# Patient Record
Sex: MALE | Race: WHITE | NOT HISPANIC OR LATINO | Employment: OTHER | URBAN - METROPOLITAN AREA
[De-identification: names, ages, dates, MRNs, and addresses within clinical notes are randomized per-mention and may not be internally consistent; named-entity substitution may affect disease eponyms.]

---

## 2024-06-03 ENCOUNTER — OFFICE VISIT (OUTPATIENT)
Dept: PRIMARY CARE | Facility: CLINIC | Age: 34
End: 2024-06-03
Payer: OTHER GOVERNMENT

## 2024-06-03 VITALS
BODY MASS INDEX: 33.46 KG/M2 | DIASTOLIC BLOOD PRESSURE: 78 MMHG | RESPIRATION RATE: 16 BRPM | WEIGHT: 247 LBS | SYSTOLIC BLOOD PRESSURE: 122 MMHG | OXYGEN SATURATION: 98 % | HEART RATE: 63 BPM | HEIGHT: 72 IN

## 2024-06-03 DIAGNOSIS — G43.511 MIGRAINE AURA, PERSISTENT, INTRACTABLE, WITH STATUS MIGRAINOSUS: ICD-10-CM

## 2024-06-03 DIAGNOSIS — F90.9 ATTENTION DEFICIT HYPERACTIVITY DISORDER (ADHD), UNSPECIFIED ADHD TYPE: ICD-10-CM

## 2024-06-03 DIAGNOSIS — N52.9 ERECTILE DYSFUNCTION, UNSPECIFIED ERECTILE DYSFUNCTION TYPE: ICD-10-CM

## 2024-06-03 DIAGNOSIS — E78.5 DYSLIPIDEMIA: ICD-10-CM

## 2024-06-03 DIAGNOSIS — S06.9XAA TRAUMATIC BRAIN INJURY, WITH UNKNOWN LOSS OF CONSCIOUSNESS STATUS, INITIAL ENCOUNTER (MULTI): ICD-10-CM

## 2024-06-03 DIAGNOSIS — E55.9 VITAMIN D DEFICIENCY: ICD-10-CM

## 2024-06-03 DIAGNOSIS — R53.83 FATIGUE, UNSPECIFIED TYPE: ICD-10-CM

## 2024-06-03 PROBLEM — G89.29 CHRONIC BILATERAL THORACIC BACK PAIN: Status: ACTIVE | Noted: 2024-06-03

## 2024-06-03 PROBLEM — M54.6 CHRONIC BILATERAL THORACIC BACK PAIN: Status: ACTIVE | Noted: 2024-06-03

## 2024-06-03 PROBLEM — G89.29 NECK PAIN, CHRONIC: Status: ACTIVE | Noted: 2024-06-03

## 2024-06-03 PROBLEM — M25.561 PAIN IN BOTH KNEES: Status: ACTIVE | Noted: 2024-06-03

## 2024-06-03 PROBLEM — M54.2 NECK PAIN, CHRONIC: Status: ACTIVE | Noted: 2024-06-03

## 2024-06-03 PROBLEM — M25.562 PAIN IN BOTH KNEES: Status: ACTIVE | Noted: 2024-06-03

## 2024-06-03 PROCEDURE — 99204 OFFICE O/P NEW MOD 45 MIN: CPT | Performed by: FAMILY MEDICINE

## 2024-06-03 PROCEDURE — 1036F TOBACCO NON-USER: CPT | Performed by: FAMILY MEDICINE

## 2024-06-03 RX ORDER — QUETIAPINE FUMARATE 50 MG/1
50 TABLET, FILM COATED ORAL NIGHTLY PRN
COMMUNITY
Start: 2024-04-10 | End: 2024-06-03 | Stop reason: SDUPTHER

## 2024-06-03 RX ORDER — CLONIDINE HYDROCHLORIDE 0.2 MG/1
0.2 TABLET ORAL DAILY PRN
COMMUNITY
Start: 2024-04-10 | End: 2024-06-03 | Stop reason: SDUPTHER

## 2024-06-03 RX ORDER — CLONIDINE HYDROCHLORIDE 0.2 MG/1
0.2 TABLET ORAL NIGHTLY
Qty: 90 TABLET | Refills: 1 | Status: SHIPPED | OUTPATIENT
Start: 2024-06-03

## 2024-06-03 RX ORDER — SILDENAFIL 100 MG/1
100 TABLET, FILM COATED ORAL DAILY PRN
Qty: 12 TABLET | Refills: 3 | Status: SHIPPED | OUTPATIENT
Start: 2024-06-03 | End: 2025-06-03

## 2024-06-03 RX ORDER — SUMATRIPTAN SUCCINATE 100 MG/1
100 TABLET ORAL ONCE AS NEEDED
Qty: 9 TABLET | Refills: 1 | Status: SHIPPED | OUTPATIENT
Start: 2024-06-03 | End: 2025-06-03

## 2024-06-03 RX ORDER — LISDEXAMFETAMINE DIMESYLATE 30 MG/1
30 CAPSULE ORAL
COMMUNITY
Start: 2024-05-22

## 2024-06-03 RX ORDER — QUETIAPINE FUMARATE 50 MG/1
50 TABLET, FILM COATED ORAL NIGHTLY
Qty: 90 TABLET | Refills: 0 | Status: SHIPPED | OUTPATIENT
Start: 2024-06-03

## 2024-06-03 NOTE — PROGRESS NOTES
Subjective   Patient ID: Robe Lr is a 33 y.o. male who presents for Establish Care.    HPI   Patient is here to establish care.     Patient was with Dr. Tenorio in the past. He is retired  and [facility].    Patient has history of TBI, migraines, back and neck pain. He has left knee surgery from past.   Patient does get auras, light sensitive, sound sensitivity with the migraines. He will get ringing in the ears and vibration in the ears.     Patient is taking Clonidine 0.2 mg and Seroquel 50 mg for insomnia. He will get 8 hours with the medications.     He would like to discuss the Vyvanse.     Review of Systems  12 Systems have been reviewed as follows.  Constitutional: Fever, weight gain, weight loss, appetite change, night sweats, fatigue, chills.  Eyes : blurry, double vision, vision, loss, tearing, redness, pain, sensitivity to light, glaucoma.  Ears, nose, mouth, and throat: Hearing loss, ringing in the ears, ear pain, nasal congestion, nasal drainage, nosebleeds, mouth, throat, irritation tooth problem.  Cardiovascular :chest pain, pressure, heart racing, palpitations, sweating, leg swelling, high or low blood pressure  Pulmonary: Cough, yellow or green sputum, blood and sputum, shortness of breath, wheezing  Gastrointestinal: Nausea, vomiting, diarrhea, constipation, pain, blood in stool, or vomitus, heartburn, difficulty swallowing  Genitourinary: incontinence, abnormal bleeding, abnormal discharge, urinary frequency, urinary hesitancy, pain, impotence sexual problem, infection, urinary retention  Musculoskeletal: Pain, stiffness, joint, redness or warmth, arthritis, back pain, weakness, muscle wasting, sprain or fracture  Neuro: Weight weakness, dizziness, change in voice, change in taste change in vision, change in hearing, loss, or change of sensation, trouble walking, balance problems coordination problems, shaking, speech problem  Endocrine , cold or heat intolerance,  blood sugar problem, weight gain or loss missed periods hot flashes, sweats, change in body hair, change in libido, increased thirst, increased urination  Heme/lymph: Swelling, bleeding, problem anemia, bruising, enlarged lymph nodes  Allergic/immunologic: H. plus nasal drip, watery itchy eyes, nasal drainage, immunosuppressed  The above were reviewed and noted negative except as noted in HPI and Problem List.      Objective   /78 (BP Location: Right arm, Patient Position: Sitting, BP Cuff Size: Adult)   Pulse 63   Resp 16   Ht 1.829 m (6')   Wt 112 kg (247 lb)   SpO2 98%   BMI 33.50 kg/m²     Physical Exam  Constitutional: Well developed, well nourished, alert and in no acute distress   Eyes: Normal external exam. Pupils equally round and reactive to light with normal accommodation and extraocular movements intact.  Neck: Supple, no lymphadenopathy or masses.   Cardiovascular: Regular rate and rhythm, normal S1 and S2, no murmurs, gallops, or rubs. Radial pulses normal. No peripheral edema.  Pulmonary: No respiratory distress, lungs clear to auscultation bilaterally. No wheezes, rhonchi, rales.  Abdomen: soft,non tender, non distended, without masses or HSM  Skin: Warm, well perfused, normal skin turgor and color.   Neurologic: Cranial nerves II-XII grossly intact.   Psychiatric: Mood calm and affect normal  Musculoskeletal: Moving all extremities without restriction    Assessment/Plan   Problem List Items Addressed This Visit             ICD-10-CM    TBI (traumatic brain injury) (Multi) S06.9XAA    Relevant Orders    Follow Up In [facility] - PCP - Established    ADHD F90.9    Relevant Medications    QUEtiapine (SEROquel) 50 mg tablet    cloNIDine (Catapres) 0.2 mg tablet    Other Relevant Orders    Follow Up In [facility] - PCP - Established    Migraine aura, persistent, intractable, with status migrainosus G43.511    Relevant Medications    SUMAtriptan (Imitrex) 100 mg tablet     Other Relevant Orders    Follow Up In [facility] - PCP - Established     Other Visit Diagnoses         Codes    Fatigue, unspecified type     R53.83    Relevant Orders    Follow Up In [facility] - PCP - Established    Dyslipidemia     E78.5    Relevant Orders    Follow Up In [facility] - PCP - Established    Vitamin D deficiency     E55.9    Relevant Orders    Follow Up In [facility] - PCP - Established    Erectile dysfunction, unspecified erectile dysfunction type     N52.9    Relevant Medications    sildenafil (Viagra) 100 mg tablet          Scribe Attestation  By signing my name below, I, Kinza Desai MA, Koreyibjose luis   attest that this documentation has been prepared under the direction and in the presence of Earl Deutsch MD.    Provider Attestation - Scribe documentation    All medical record entries made by the Scribe were at my direction and personally dictated by me. I have reviewed the chart and agree that the record accurately reflects my personal performance of the history, physical exam, discussion and plan.    Continue current medications and therapy for chronic medical conditions      [facility]     See orders

## 2024-06-20 NOTE — TELEPHONE ENCOUNTER
Dr. Deutsch Pt    Refill for    lisdexamfetamine (Vyvanse) 30 mg capsule    [facility] #[id number] [facility] [address] AT [facility]

## 2024-06-24 RX ORDER — LISDEXAMFETAMINE DIMESYLATE 30 MG/1
30 CAPSULE ORAL
Qty: 30 CAPSULE | Refills: 0 | Status: CANCELLED | OUTPATIENT
Start: 2024-06-24 | End: 2024-07-24

## 2024-06-24 RX ORDER — LISDEXAMFETAMINE DIMESYLATE 30 MG/1
30 CAPSULE ORAL
Qty: 30 CAPSULE | Refills: 0 | OUTPATIENT
Start: 2024-06-24

## 2024-06-24 NOTE — TELEPHONE ENCOUNTER
CB patient     Per CB patient needs a office visit.  
Dr. Deutsch Pt     Refill for     lisdexamfetamine (Vyvanse) 30 mg capsule     [id number] [address] AT [facility] OF [facility]    Message was sent back 6/20. He does have OV 7/09, last appt was 6/03- this was refused is there a reason?  Please advise pt is calling again.  
Next visit 7/9/24  
no

## 2024-06-25 DIAGNOSIS — Z79.899 MEDICATION MANAGEMENT: ICD-10-CM

## 2024-06-25 DIAGNOSIS — F90.9 ATTENTION DEFICIT HYPERACTIVITY DISORDER (ADHD), UNSPECIFIED ADHD TYPE: ICD-10-CM

## 2024-06-25 NOTE — TELEPHONE ENCOUNTER
LVM to patient. He will need to come in for UDS and contract for Vyvanse. He is also taking clonidine and need to check how he is doing with the medication.

## 2024-06-25 NOTE — TELEPHONE ENCOUNTER
Patient called back and is irate that the information about needing a UDS for the refill of Vyvanse took over 4 phone calls. He is asking if a short script can be sent until his 7/9/24 visit so he is not out of the medication. In the meantime, he will come in ASAP for a UDS to get done prior to his visit.

## 2024-06-26 ENCOUNTER — CLINICAL SUPPORT (OUTPATIENT)
Dept: PRIMARY CARE | Facility: CLINIC | Age: 34
End: 2024-06-26
Payer: OTHER GOVERNMENT

## 2024-06-26 DIAGNOSIS — Z79.899 MEDICATION MANAGEMENT: ICD-10-CM

## 2024-06-26 PROCEDURE — 80365 DRUG SCREENING OXYCODONE: CPT

## 2024-06-26 PROCEDURE — 80358 DRUG SCREENING METHADONE: CPT

## 2024-06-26 PROCEDURE — 80368 SEDATIVE HYPNOTICS: CPT

## 2024-06-26 PROCEDURE — 80373 DRUG SCREENING TRAMADOL: CPT

## 2024-06-26 PROCEDURE — 80354 DRUG SCREENING FENTANYL: CPT

## 2024-06-26 PROCEDURE — 82570 ASSAY OF URINE CREATININE: CPT

## 2024-06-26 PROCEDURE — 80324 DRUG SCREEN AMPHETAMINES 1/2: CPT

## 2024-06-26 PROCEDURE — 80346 BENZODIAZEPINES1-12: CPT

## 2024-06-26 PROCEDURE — 80307 DRUG TEST PRSMV CHEM ANLYZR: CPT

## 2024-06-26 PROCEDURE — 80361 OPIATES 1 OR MORE: CPT

## 2024-06-26 NOTE — PROGRESS NOTES
Subjective   Patient ID: Robe Lr is a 34 y.o. male who presents for No chief complaint on file..    HPI Pt presents today for UDS/CSA for Vyvanse 30 mg.    Review of Systems    Objective   There were no vitals taken for this visit.    Physical Exam    Assessment/Plan

## 2024-06-27 LAB
AMPHETAMINES UR QL SCN: ABNORMAL
BARBITURATES UR QL SCN: ABNORMAL
BZE UR QL SCN: ABNORMAL
CANNABINOIDS UR QL SCN: ABNORMAL
CREAT UR-MCNC: 57 MG/DL (ref 20–370)
PCP UR QL SCN: ABNORMAL

## 2024-06-27 RX ORDER — LISDEXAMFETAMINE DIMESYLATE 30 MG/1
30 CAPSULE ORAL
Qty: 15 CAPSULE | Refills: 0 | Status: SHIPPED | OUTPATIENT
Start: 2024-06-27

## 2024-06-30 LAB
AMPHET UR-MCNC: 1761 NG/ML
MDA UR-MCNC: <200 NG/ML
MDEA UR-MCNC: <200 NG/ML
MDMA UR-MCNC: <200 NG/ML
METHAMPHET UR-MCNC: <200 NG/ML
PHENTERMINE UR CFM-MCNC: <200 NG/ML

## 2024-07-01 LAB
AMPHET UR-MCNC: 1963 NG/ML
MDA UR-MCNC: <200 NG/ML
MDEA UR-MCNC: <200 NG/ML
MDMA UR-MCNC: <200 NG/ML
METHAMPHET UR-MCNC: <200 NG/ML
PHENTERMINE UR CFM-MCNC: <200 NG/ML

## 2024-07-09 ENCOUNTER — APPOINTMENT (OUTPATIENT)
Dept: PRIMARY CARE | Facility: CLINIC | Age: 34
End: 2024-07-09
Payer: OTHER GOVERNMENT

## 2024-07-12 ENCOUNTER — APPOINTMENT (OUTPATIENT)
Dept: PRIMARY CARE | Facility: CLINIC | Age: 34
End: 2024-07-12
Payer: OTHER GOVERNMENT

## 2024-07-12 VITALS
OXYGEN SATURATION: 99 % | TEMPERATURE: 98.4 F | HEART RATE: 61 BPM | BODY MASS INDEX: 34.13 KG/M2 | SYSTOLIC BLOOD PRESSURE: 118 MMHG | HEIGHT: 72 IN | WEIGHT: 252 LBS | DIASTOLIC BLOOD PRESSURE: 88 MMHG

## 2024-07-12 DIAGNOSIS — S06.9XAA TRAUMATIC BRAIN INJURY, WITH UNKNOWN LOSS OF CONSCIOUSNESS STATUS, INITIAL ENCOUNTER (MULTI): ICD-10-CM

## 2024-07-12 DIAGNOSIS — R53.83 FATIGUE, UNSPECIFIED TYPE: ICD-10-CM

## 2024-07-12 DIAGNOSIS — E55.9 VITAMIN D DEFICIENCY: ICD-10-CM

## 2024-07-12 DIAGNOSIS — N52.9 ERECTILE DYSFUNCTION, UNSPECIFIED ERECTILE DYSFUNCTION TYPE: ICD-10-CM

## 2024-07-12 DIAGNOSIS — G43.511 MIGRAINE AURA, PERSISTENT, INTRACTABLE, WITH STATUS MIGRAINOSUS: ICD-10-CM

## 2024-07-12 DIAGNOSIS — F90.9 ATTENTION DEFICIT HYPERACTIVITY DISORDER (ADHD), UNSPECIFIED ADHD TYPE: ICD-10-CM

## 2024-07-12 DIAGNOSIS — E78.5 DYSLIPIDEMIA: ICD-10-CM

## 2024-07-12 PROCEDURE — 99214 OFFICE O/P EST MOD 30 MIN: CPT | Performed by: FAMILY MEDICINE

## 2024-07-12 RX ORDER — LISDEXAMFETAMINE DIMESYLATE 30 MG/1
30 CAPSULE ORAL
Qty: 30 CAPSULE | Refills: 0 | Status: SHIPPED | OUTPATIENT
Start: 2024-07-12

## 2024-07-12 RX ORDER — SILDENAFIL 100 MG/1
100 TABLET, FILM COATED ORAL DAILY PRN
Qty: 12 TABLET | Refills: 3 | Status: SHIPPED | OUTPATIENT
Start: 2024-07-12 | End: 2025-07-12

## 2024-07-12 NOTE — PROGRESS NOTES
Subjective   Patient ID: Robe Lr is a 34 y.o. male who presents for ADHD.    HPI   Follow up ADHD   Currently taking Vyvanse 30 mg daily with good symptom control, good tolerance, and good compliance.   UDS done 06/26/2024  CSA done 07/12/2024    Review of Systems  12 Systems have been reviewed as follows.  Constitutional: Fever, weight gain, weight loss, appetite change, night sweats, fatigue, chills.  Eyes : blurry, double vision, vision, loss, tearing, redness, pain, sensitivity to light, glaucoma.  Ears, nose, mouth, and throat: Hearing loss, ringing in the ears, ear pain, nasal congestion, nasal drainage, nosebleeds, mouth, throat, irritation tooth problem.  Cardiovascular :chest pain, pressure, heart racing, palpitations, sweating, leg swelling, high or low blood pressure  Pulmonary: Cough, yellow or green sputum, blood and sputum, shortness of breath, wheezing  Gastrointestinal: Nausea, vomiting, diarrhea, constipation, pain, blood in stool, or vomitus, heartburn, difficulty swallowing  Genitourinary: incontinence, abnormal bleeding, abnormal discharge, urinary frequency, urinary hesitancy, pain, impotence sexual problem, infection, urinary retention  Musculoskeletal: Pain, stiffness, joint, redness or warmth, arthritis, back pain, weakness, muscle wasting, sprain or fracture  Neuro: Weight weakness, dizziness, change in voice, change in taste change in vision, change in hearing, loss, or change of sensation, trouble walking, balance problems coordination problems, shaking, speech problem  Endocrine , cold or heat intolerance, blood sugar problem, weight gain or loss missed periods hot flashes, sweats, change in body hair, change in libido, increased thirst, increased urination  Heme/lymph: Swelling, bleeding, problem anemia, bruising, enlarged lymph nodes  Allergic/immunologic: H. plus nasal drip, watery itchy eyes, nasal drainage, immunosuppressed  The above were reviewed and noted negative except  as noted in HPI and Problem List.    Objective   /88 (BP Location: Right arm, Patient Position: Sitting, BP Cuff Size: Adult)   Pulse 61   Temp 36.9 °C (98.4 °F)   Ht 1.829 m (6')   Wt 114 kg (252 lb)   SpO2 99%   BMI 34.18 kg/m²     Physical Exam  Constitutional: Well developed, well nourished, alert and in no acute distress     Assessment/Plan   Problem List Items Addressed This Visit             ICD-10-CM    TBI (traumatic brain injury) (Multi) S06.9XAA    Relevant Orders    Follow Up In [facility] - PCP - Established    ADHD F90.9    Relevant Medications    lisdexamfetamine (Vyvanse) 30 mg capsule    Other Relevant Orders    Follow Up In [facility] - PCP - Established    Migraine aura, persistent, intractable, with status migrainosus G43.511    Relevant Orders    Follow Up In [facility] - PCP - Established     Other Visit Diagnoses         Codes    Fatigue, unspecified type     R53.83    Relevant Orders    Follow Up In [facility] - PCP - Established    Dyslipidemia     E78.5    Relevant Orders    Follow Up In [facility] - PCP - Established    Vitamin D deficiency     E55.9    Relevant Orders    Follow Up In [facility] - PCP - Established    Erectile dysfunction, unspecified erectile dysfunction type     N52.9    Relevant Medications    sildenafil (Viagra) 100 mg tablet    Other Relevant Orders    Follow Up In [facility] - PCP - Established          Continue current medications and therapy for chronic medical conditions    I have personally reviewed the OARRS report with the patient and have considered the risk of abuse, addiction, dependence and diversion.    Patient's use of medication is allowing patient to be able to perform ADL's. Patient is always being evaluated for the possibility of lowering the medication dosage.

## 2024-08-08 DIAGNOSIS — F90.9 ATTENTION DEFICIT HYPERACTIVITY DISORDER (ADHD), UNSPECIFIED ADHD TYPE: ICD-10-CM

## 2024-08-08 NOTE — TELEPHONE ENCOUNTER
Dr. Deutsch patient  Refill for:  cloNIDine (Catapres) 0.2 mg tablet   QUEtiapine (SEROquel) 50 mg tablet  lisdexamfetamine (Vyvanse) 30 mg capsule   [facility] in [facility]

## 2024-08-11 RX ORDER — LISDEXAMFETAMINE DIMESYLATE 30 MG/1
30 CAPSULE ORAL
Qty: 30 CAPSULE | Refills: 0 | Status: SHIPPED | OUTPATIENT
Start: 2024-08-11

## 2024-08-11 RX ORDER — CLONIDINE HYDROCHLORIDE 0.2 MG/1
0.2 TABLET ORAL NIGHTLY
Qty: 90 TABLET | Refills: 1 | Status: SHIPPED | OUTPATIENT
Start: 2024-08-11

## 2024-08-11 RX ORDER — QUETIAPINE FUMARATE 50 MG/1
50 TABLET, FILM COATED ORAL NIGHTLY
Qty: 90 TABLET | Refills: 0 | Status: SHIPPED | OUTPATIENT
Start: 2024-08-11

## 2024-09-09 DIAGNOSIS — F90.9 ATTENTION DEFICIT HYPERACTIVITY DISORDER (ADHD), UNSPECIFIED ADHD TYPE: ICD-10-CM

## 2024-09-09 NOTE — TELEPHONE ENCOUNTER
Dr. Deutsch patient  Refill for lisdexamfetamine (Vyvanse) 30 mg capsule  [facility] #[id number] [facility] [address] AT [facility]

## 2024-09-11 RX ORDER — LISDEXAMFETAMINE DIMESYLATE 30 MG/1
30 CAPSULE ORAL
Qty: 30 CAPSULE | Refills: 0 | Status: SHIPPED | OUTPATIENT
Start: 2024-09-11

## 2024-10-04 ENCOUNTER — OFFICE VISIT (OUTPATIENT)
Dept: PRIMARY CARE | Facility: CLINIC | Age: 34
End: 2024-10-04
Payer: OTHER GOVERNMENT

## 2024-10-04 VITALS
BODY MASS INDEX: 33.08 KG/M2 | TEMPERATURE: 98.3 F | WEIGHT: 244.2 LBS | HEIGHT: 72 IN | DIASTOLIC BLOOD PRESSURE: 70 MMHG | RESPIRATION RATE: 16 BRPM | HEART RATE: 70 BPM | OXYGEN SATURATION: 99 % | SYSTOLIC BLOOD PRESSURE: 120 MMHG

## 2024-10-04 DIAGNOSIS — N52.9 ERECTILE DYSFUNCTION, UNSPECIFIED ERECTILE DYSFUNCTION TYPE: ICD-10-CM

## 2024-10-04 DIAGNOSIS — G43.511 MIGRAINE AURA, PERSISTENT, INTRACTABLE, WITH STATUS MIGRAINOSUS: ICD-10-CM

## 2024-10-04 DIAGNOSIS — E55.9 VITAMIN D DEFICIENCY: ICD-10-CM

## 2024-10-04 DIAGNOSIS — G47.09 OTHER INSOMNIA: Primary | ICD-10-CM

## 2024-10-04 DIAGNOSIS — E78.5 DYSLIPIDEMIA: ICD-10-CM

## 2024-10-04 DIAGNOSIS — R53.83 FATIGUE, UNSPECIFIED TYPE: ICD-10-CM

## 2024-10-04 DIAGNOSIS — S06.9XAA TRAUMATIC BRAIN INJURY, WITH UNKNOWN LOSS OF CONSCIOUSNESS STATUS, INITIAL ENCOUNTER (MULTI): ICD-10-CM

## 2024-10-04 DIAGNOSIS — F90.9 ATTENTION DEFICIT HYPERACTIVITY DISORDER (ADHD), UNSPECIFIED ADHD TYPE: ICD-10-CM

## 2024-10-04 DIAGNOSIS — Z79.899 MEDICATION MANAGEMENT: ICD-10-CM

## 2024-10-04 PROCEDURE — 1036F TOBACCO NON-USER: CPT | Performed by: FAMILY MEDICINE

## 2024-10-04 PROCEDURE — 99214 OFFICE O/P EST MOD 30 MIN: CPT | Performed by: FAMILY MEDICINE

## 2024-10-04 PROCEDURE — 3008F BODY MASS INDEX DOCD: CPT | Performed by: FAMILY MEDICINE

## 2024-10-04 RX ORDER — LISDEXAMFETAMINE DIMESYLATE 30 MG/1
30 CAPSULE ORAL
Qty: 30 CAPSULE | Refills: 0 | Status: SHIPPED | OUTPATIENT
Start: 2024-10-10

## 2024-10-04 ASSESSMENT — ENCOUNTER SYMPTOMS
JOINT SWELLING: 0
CHOKING: 0
HYPERACTIVE: 0
CONSTITUTIONAL NEGATIVE: 1
HEMATURIA: 0
WHEEZING: 0
PHOTOPHOBIA: 0
TROUBLE SWALLOWING: 0
DIZZINESS: 0
APPETITE CHANGE: 0
SINUS PRESSURE: 0
APNEA: 0
DIFFICULTY URINATING: 0
ABDOMINAL PAIN: 0
CONSTIPATION: 0
HALLUCINATIONS: 0
RECTAL PAIN: 0
DIARRHEA: 0
ANAL BLEEDING: 0
BACK PAIN: 0
CONFUSION: 0
NAUSEA: 0
DIAPHORESIS: 0
FACIAL ASYMMETRY: 0
BRUISES/BLEEDS EASILY: 0
WEAKNESS: 0
NUMBNESS: 0
SEIZURES: 0
VOMITING: 0
CHILLS: 0
HEADACHES: 0
MYALGIAS: 0
RHINORRHEA: 0
UNEXPECTED WEIGHT CHANGE: 0
NECK PAIN: 0
NECK STIFFNESS: 0
COLOR CHANGE: 0
FACIAL SWELLING: 0
STRIDOR: 0
SHORTNESS OF BREATH: 0
EYE DISCHARGE: 0
COUGH: 0
GASTROINTESTINAL NEGATIVE: 1
ADENOPATHY: 0
PALPITATIONS: 0
DECREASED CONCENTRATION: 0
DYSPHORIC MOOD: 0
FATIGUE: 0
NERVOUS/ANXIOUS: 1
ABDOMINAL DISTENTION: 0
EYE ITCHING: 0
FREQUENCY: 0
BLOOD IN STOOL: 0
WOUND: 0
EYE REDNESS: 0
POLYPHAGIA: 0
VOICE CHANGE: 0
ARTHRALGIAS: 0
SINUS PAIN: 0
SORE THROAT: 0
TREMORS: 0
FEVER: 0
POLYDIPSIA: 0
FLANK PAIN: 0
EYE PAIN: 0
LIGHT-HEADEDNESS: 0
CARDIOVASCULAR NEGATIVE: 1
AGITATION: 0
DYSURIA: 0
ACTIVITY CHANGE: 0
CHEST TIGHTNESS: 0
SLEEP DISTURBANCE: 0
DEPRESSION: 0
SPEECH DIFFICULTY: 0

## 2024-10-04 NOTE — PROGRESS NOTES
Subjective   Patient ID: Robe Lr is a 34 y.o. male who presents for ADHD.    HPI   The patient is in office for  follow up on his Vyvanse medication. He states the medication is working well for him.     The patient does not have any other questions or concerns.     Review of Systems   Constitutional: Negative.  Negative for activity change, appetite change, chills, diaphoresis, fatigue, fever and unexpected weight change.   HENT: Negative.  Negative for congestion, dental problem, ear discharge, facial swelling, hearing loss, mouth sores, nosebleeds, postnasal drip, rhinorrhea, sinus pressure, sinus pain, sneezing, sore throat, tinnitus, trouble swallowing and voice change.    Eyes:  Negative for photophobia, pain, discharge, redness, itching and visual disturbance.   Respiratory:  Negative for apnea, cough, choking, chest tightness, shortness of breath, wheezing and stridor.    Cardiovascular: Negative.  Negative for chest pain, palpitations and leg swelling.   Gastrointestinal: Negative.  Negative for abdominal distention, abdominal pain, anal bleeding, blood in stool, constipation, diarrhea, nausea, rectal pain and vomiting.   Endocrine: Negative for cold intolerance, heat intolerance, polydipsia, polyphagia and polyuria.   Genitourinary:  Negative for decreased urine volume, difficulty urinating, dysuria, enuresis, flank pain, frequency, hematuria and urgency.   Musculoskeletal:  Negative for arthralgias, back pain, gait problem, joint swelling, myalgias, neck pain and neck stiffness.   Skin:  Negative for color change, pallor, rash and wound.   Allergic/Immunologic: Negative for environmental allergies, food allergies and immunocompromised state.   Neurological:  Negative for dizziness, tremors, seizures, syncope, facial asymmetry, speech difficulty, weakness, light-headedness, numbness and headaches.   Hematological:  Negative for adenopathy. Does not bruise/bleed easily.   Psychiatric/Behavioral:   Negative for agitation, behavioral problems, confusion, decreased concentration, dysphoric mood, hallucinations, self-injury, sleep disturbance and suicidal ideas. The patient is nervous/anxious. The patient is not hyperactive.    All other systems reviewed and are negative.      Objective   /70 (BP Location: Left arm, Patient Position: Sitting, BP Cuff Size: Adult)   Pulse 70   Temp 36.8 °C (98.3 °F) (Temporal)   Resp 16   Ht 1.829 m (6')   Wt 111 kg (244 lb 3.2 oz)   SpO2 99%   BMI 33.12 kg/m²     Physical Exam  Vitals reviewed.   Constitutional:       General: He is not in acute distress.     Appearance: Normal appearance. He is normal weight. He is not ill-appearing or diaphoretic.   HENT:      Head: Normocephalic.      Right Ear: Tympanic membrane and external ear normal.      Left Ear: Tympanic membrane and external ear normal.      Nose: Nose normal. No congestion.      Mouth/Throat:      Pharynx: No posterior oropharyngeal erythema.   Eyes:      General:         Right eye: No discharge.         Left eye: No discharge.      Extraocular Movements: Extraocular movements intact.      Conjunctiva/sclera: Conjunctivae normal.      Pupils: Pupils are equal, round, and reactive to light.   Cardiovascular:      Rate and Rhythm: Normal rate and regular rhythm.      Pulses: Normal pulses.      Heart sounds: Normal heart sounds. No murmur heard.  Pulmonary:      Effort: Pulmonary effort is normal. No respiratory distress.      Breath sounds: Normal breath sounds. No wheezing or rales.   Chest:      Chest wall: No tenderness.   Abdominal:      General: Abdomen is flat. Bowel sounds are normal. There is no distension.      Palpations: There is no mass.      Tenderness: There is no abdominal tenderness. There is no guarding.   Musculoskeletal:         General: No tenderness. Normal range of motion.      Cervical back: Normal range of motion and neck supple. No tenderness.      Right lower leg: No edema.       Left lower leg: No edema.   Skin:     General: Skin is dry.      Coloration: Skin is not jaundiced.      Findings: No bruising, erythema or rash.   Neurological:      General: No focal deficit present.      Mental Status: He is alert and oriented to person, place, and time. Mental status is at baseline.      Cranial Nerves: No cranial nerve deficit.      Sensory: No sensory deficit.      Coordination: Coordination normal.      Gait: Gait normal.   Psychiatric:         Mood and Affect: Mood normal.         Thought Content: Thought content normal.         Judgment: Judgment normal.         Assessment/Plan   Problem List Items Addressed This Visit             ICD-10-CM    TBI (traumatic brain injury) (Multi) S06.9XAA    Relevant Orders    Follow Up In [facility] - PCP - Established    ADHD F90.9    Relevant Medications    lisdexamfetamine (Vyvanse) 30 mg capsule (Start on 10/10/2024)    Other Relevant Orders    Follow Up In [facility] - PCP - Established    Migraine aura, persistent, intractable, with status migrainosus G43.511    Relevant Orders    Follow Up In [facility] - PCP - Established     Other Visit Diagnoses         Codes    Other insomnia    -  Primary G47.09    Relevant Orders    Follow Up In [facility] - PCP - Established    Fatigue, unspecified type     R53.83    Relevant Orders    Follow Up In [facility] - PCP - Established    Dyslipidemia     E78.5    Relevant Orders    Follow Up In [facility] - PCP - Established    Vitamin D deficiency     E55.9    Relevant Orders    Follow Up In [facility] - PCP - Established    Erectile dysfunction, unspecified erectile dysfunction type     N52.9    Relevant Orders    Follow Up In [facility] - PCP - Established    Medication management     Z79.899    Relevant Orders    Opiate/Opioid/Benzo Prescription Compliance    Follow Up In [facility] - PCP - Established           Scribe Attestation  By signing my name below, I, Sandra RandhawaABISAI escalante Scribe   attest that this documentation has been prepared under the direction and in the presence of Earl Deutsch MD.      Provider Attestation - Scribe documentation    All medical record entries made by the Scribe were at my direction and personally dictated by me. I have reviewed the chart and agree that the record accurately reflects my personal performance of the history, physical exam, discussion and plan.    Continue current medications and therapy for chronic medical conditions      Patient lost 8 lbs from last visit.     Refill Vyvanse    UDS next & Q 6 months     Continue current medications and therapy for chronic medical conditions    I have personally reviewed the OARRS report with the patient and have considered the risk of abuse, addiction, dependence and diversion.    Patient's use of medication is allowing patient to be able to perform ADL's. Patient is always being evaluated for the possibility of lowering the medication dosage.         Earl Deutsch MD  Physician  Primary Care     Progress Notes     Signed     Encounter Date: 7/12/2024     Signed       Expand All Collapse All       Subjective  Patient ID: Robe Lr is a 34 y.o. male who presents for ADHD.     HPI   Follow up ADHD   Currently taking Vyvanse 30 mg daily with good symptom control, good tolerance, and good compliance.   UDS done 06/26/2024  CSA done 07/12/2024     Review of Systems  12 Systems have been reviewed as follows.  Constitutional: Fever, weight gain, weight loss, appetite change, night sweats, fatigue, chills.  Eyes : blurry, double vision, vision, loss, tearing, redness, pain, sensitivity to light, glaucoma.  Ears, nose, mouth, and throat: Hearing loss, ringing in the ears, ear pain, nasal congestion, nasal drainage, nosebleeds, mouth, throat, irritation tooth problem.  Cardiovascular :chest pain, pressure, heart racing, palpitations,  sweating, leg swelling, high or low blood pressure  Pulmonary: Cough, yellow or green sputum, blood and sputum, shortness of breath, wheezing  Gastrointestinal: Nausea, vomiting, diarrhea, constipation, pain, blood in stool, or vomitus, heartburn, difficulty swallowing  Genitourinary: incontinence, abnormal bleeding, abnormal discharge, urinary frequency, urinary hesitancy, pain, impotence sexual problem, infection, urinary retention  Musculoskeletal: Pain, stiffness, joint, redness or warmth, arthritis, back pain, weakness, muscle wasting, sprain or fracture  Neuro: Weight weakness, dizziness, change in voice, change in taste change in vision, change in hearing, loss, or change of sensation, trouble walking, balance problems coordination problems, shaking, speech problem  Endocrine , cold or heat intolerance, blood sugar problem, weight gain or loss missed periods hot flashes, sweats, change in body hair, change in libido, increased thirst, increased urination  Heme/lymph: Swelling, bleeding, problem anemia, bruising, enlarged lymph nodes  Allergic/immunologic: H. plus nasal drip, watery itchy eyes, nasal drainage, immunosuppressed  The above were reviewed and noted negative except as noted in HPI and Problem List.           Objective  /88 (BP Location: Right arm, Patient Position: Sitting, BP Cuff Size: Adult)   Pulse 61   Temp 36.9 °C (98.4 °F)   Ht 1.829 m (6')   Wt 114 kg (252 lb)   SpO2 99%   BMI 34.18 kg/m²      Physical Exam  Constitutional: Well developed, well nourished, alert and in no acute distress            Assessment/Plan  Problem List Items Addressed This Visit               ICD-10-CM     TBI (traumatic brain injury) (Multi) S06.9XAA     Relevant Orders     Follow Up In [facility] - PCP - Established     ADHD F90.9     Relevant Medications     lisdexamfetamine (Vyvanse) 30 mg capsule     Other Relevant Orders     Follow Up In [facility] - PCP - Established      Migraine aura, persistent, intractable, with status migrainosus G43.511     Relevant Orders     Follow Up In [facility] - PCP - Established      Other Visit Diagnoses           Codes     Fatigue, unspecified type     R53.83     Relevant Orders     Follow Up In [facility] - PCP - Established     Dyslipidemia     E78.5     Relevant Orders     Follow Up In [facility] - PCP - Established     Vitamin D deficiency     E55.9     Relevant Orders     Follow Up In [facility] - PCP - Established     Erectile dysfunction, unspecified erectile dysfunction type     N52.9     Relevant Medications     sildenafil (Viagra) 100 mg tablet     Other Relevant Orders     Follow Up In [facility] - PCP - Established             Continue current medications and therapy for chronic medical conditions     I have personally reviewed the OARRS report with the patient and have considered the risk of abuse, addiction, dependence and diversion.     Patient's use of medication is allowing patient to be able to perform ADL's. Patient is always being evaluated for the possibility of lowering the medication dosage.                        Electronically signed by Earl Deutsch MD at 7/14/2024  1:09 PM         Office Visit on 7/12/2024            Revision History          Note shared with patient  Additional Documentation    Vitals: /88 (BP Location: Right arm, Patient Position: Sitting, BP Cuff Size: Adult)     Pulse 61     Temp 36.9 °C (98.4 °F)     Ht 1.829 m (6')     Wt 114 kg (252 lb)     SpO2 99%     BMI 34.18 kg/m²     BSA 2.41 m²          More Vitals   Flowsheets: Interfaced Flowsheet Data,     RADAR AP SCORING   Encounter Info: Billing Info,     History,     Allergies     Orders Placed    Follow Up In [facility] - PCP - Established  Other Orders Performed    Follow Up In [facility] - PCP - Established  Medication Changes      None  Medication List  Visit  Diagnoses      Attention deficit hyperactivity disorder (ADHD), unspecified ADHD type    Traumatic brain injury, with unknown loss of consciousness status, initial encounter (Multi)    Migraine aura, persistent, intractable, with status migrainosus    Fatigue, unspecified type    Dyslipidemia    Vitamin D deficiency    Erectile dysfunction, unspecified erectile dysfunction type  Problem List

## 2024-10-05 LAB
AMPHETAMINES UR QL SCN: ABNORMAL
BARBITURATES UR QL SCN: ABNORMAL
BZE UR QL SCN: ABNORMAL
CANNABINOIDS UR QL SCN: ABNORMAL
CREAT UR-MCNC: 87.2 MG/DL (ref 20–370)
PCP UR QL SCN: ABNORMAL

## 2024-10-10 LAB
AMPHET UR-MCNC: 3280 NG/ML
MDA UR-MCNC: <200 NG/ML
MDEA UR-MCNC: <200 NG/ML
MDMA UR-MCNC: <200 NG/ML
METHAMPHET UR-MCNC: <200 NG/ML
PHENTERMINE UR CFM-MCNC: <200 NG/ML

## 2024-10-11 ENCOUNTER — APPOINTMENT (OUTPATIENT)
Dept: PRIMARY CARE | Facility: CLINIC | Age: 34
End: 2024-10-11
Payer: OTHER GOVERNMENT

## 2024-11-09 DIAGNOSIS — F90.9 ATTENTION DEFICIT HYPERACTIVITY DISORDER (ADHD), UNSPECIFIED ADHD TYPE: ICD-10-CM

## 2024-11-11 NOTE — TELEPHONE ENCOUNTER
PT OF [facility]     PT CALLED IN FOR MED REFILL    SEROQUEL   [facility]SE  [facility]ONIDINE     [facility]

## 2024-11-12 RX ORDER — QUETIAPINE FUMARATE 50 MG/1
TABLET, FILM COATED ORAL
Qty: 90 TABLET | Refills: 0 | Status: SHIPPED | OUTPATIENT
Start: 2024-11-12

## 2024-11-12 RX ORDER — LISDEXAMFETAMINE DIMESYLATE 30 MG/1
30 CAPSULE ORAL
Qty: 30 CAPSULE | Refills: 0 | Status: SHIPPED | OUTPATIENT
Start: 2024-11-12

## 2024-11-12 RX ORDER — CLONIDINE HYDROCHLORIDE 0.2 MG/1
0.2 TABLET ORAL NIGHTLY
Qty: 90 TABLET | Refills: 1 | Status: SHIPPED | OUTPATIENT
Start: 2024-11-12

## 2024-12-09 DIAGNOSIS — F90.9 ATTENTION DEFICIT HYPERACTIVITY DISORDER (ADHD), UNSPECIFIED ADHD TYPE: ICD-10-CM

## 2024-12-09 NOTE — TELEPHONE ENCOUNTER
[facility] #[id number] [place], [place] - [address] AT [facility] [place]   Pt needs refill on  lisdexamfetamine (Vyvanse) 30 mg capsule

## 2024-12-12 RX ORDER — LISDEXAMFETAMINE DIMESYLATE 30 MG/1
30 CAPSULE ORAL
Qty: 30 CAPSULE | Refills: 0 | Status: SHIPPED | OUTPATIENT
Start: 2024-12-12

## 2025-01-10 DIAGNOSIS — F90.9 ATTENTION DEFICIT HYPERACTIVITY DISORDER (ADHD), UNSPECIFIED ADHD TYPE: ICD-10-CM

## 2025-01-10 NOTE — TELEPHONE ENCOUNTER
[facility]ORE #[id number] [place], [place] - [address] AT [facility] [place]   Pt needs refill on  lisdexamfetamine (Vyvanse) 30 mg capsule

## 2025-01-11 ENCOUNTER — OFFICE VISIT (OUTPATIENT)
Dept: URGENT CARE | Age: 35
End: 2025-01-11
Payer: OTHER GOVERNMENT

## 2025-01-11 VITALS
HEIGHT: 78 IN | OXYGEN SATURATION: 99 % | DIASTOLIC BLOOD PRESSURE: 73 MMHG | WEIGHT: 222 LBS | SYSTOLIC BLOOD PRESSURE: 108 MMHG | BODY MASS INDEX: 25.69 KG/M2 | RESPIRATION RATE: 18 BRPM | HEART RATE: 55 BPM | TEMPERATURE: 98.5 F

## 2025-01-11 DIAGNOSIS — J40 BRONCHITIS: Primary | ICD-10-CM

## 2025-01-11 RX ORDER — AZITHROMYCIN 250 MG/1
TABLET, FILM COATED ORAL
Qty: 6 TABLET | Refills: 0 | Status: SHIPPED | OUTPATIENT
Start: 2025-01-11 | End: 2025-01-15

## 2025-01-11 RX ORDER — ALBUTEROL SULFATE 90 UG/1
2 INHALANT RESPIRATORY (INHALATION) EVERY 6 HOURS PRN
Qty: 18 G | Refills: 0 | Status: SHIPPED | OUTPATIENT
Start: 2025-01-11 | End: 2026-01-11

## 2025-01-11 RX ORDER — PREDNISONE 10 MG/1
TABLET ORAL
Qty: 21 TABLET | Refills: 0 | Status: SHIPPED | OUTPATIENT
Start: 2025-01-11 | End: 2025-01-19

## 2025-01-11 RX ORDER — BENZONATATE 200 MG/1
200 CAPSULE ORAL 3 TIMES DAILY PRN
Qty: 30 CAPSULE | Refills: 0 | Status: SHIPPED | OUTPATIENT
Start: 2025-01-11

## 2025-01-11 ASSESSMENT — ENCOUNTER SYMPTOMS
CHILLS: 1
WHEEZING: 1
GASTROINTESTINAL NEGATIVE: 1
RHINORRHEA: 0
SORE THROAT: 1
SINUS PRESSURE: 1
COUGH: 1
EYES NEGATIVE: 1
FEVER: 0
CARDIOVASCULAR NEGATIVE: 1

## 2025-01-11 NOTE — PROGRESS NOTES
"Subjective   Patient ID: Robe Lr is a 34 y.o. male. They present today with a chief complaint of Cough and Other (PT STATES POSSIBLY PNEUMONIA AND COUGHING UP BLOOD. SX X 9 DAYS).    History of Present Illness  Subjective  History was provided by the patient.  Robe Lr is a 34 y.o. male who presents for evaluation of symptoms of a URI. Symptoms include chest pain during cough, chills, dry cough, headache, nasal blockage, sinus and nasal congestion, sore throat, and wheezing. Onset of symptoms was 9 days ago, unchanged since that time. Associated symptoms include no  fever. He is drinking moderate amounts of fluids. Evaluation to date: none. Treatment to date: none    Objective  /73 (BP Location: Left arm, Patient Position: Sitting, BP Cuff Size: Large adult)   Pulse 55   Temp 36.9 °C (98.5 °F) (Oral)   Resp 18   Ht 2.235 m (7' 4\")   Wt 101 kg (222 lb)   SpO2 99%   BMI 20.16 kg/m²   [unfilled]     Assessment/Plan  bronchitis and viral upper respiratory illness    Discussed diagnosis and treatment of URI.  Suggested symptomatic OTC remedies.  Nasal saline spray for congestion.  Azithromycin per orders.  Follow up as needed.        History provided by:  Patient and medical records  Cough  Associated symptoms include chills, a sore throat and wheezing. Pertinent negatives include no ear pain, fever or rhinorrhea.       Past Medical History  Allergies as of 01/11/2025    (No Known Allergies)       (Not in a hospital admission)       Past Medical History:   Diagnosis Date    Back pain     Migraine     Neck pain     TBI (traumatic brain injury) (Multi)     In , he fell 30 feet and landed on head.       Past Surgical History:   Procedure Laterality Date    KNEE SURGERY Left         reports that he has quit smoking. His smoking use included cigarettes. He has never used smokeless tobacco. He reports current alcohol use. He reports that he does not use drugs.    Review of " "Systems  Review of Systems   Constitutional:  Positive for chills. Negative for fever.   HENT:  Positive for congestion, sinus pressure, sore throat and tinnitus. Negative for ear pain and rhinorrhea.    Eyes: Negative.    Respiratory:  Positive for cough and wheezing.    Cardiovascular: Negative.    Gastrointestinal: Negative.    All other systems reviewed and are negative.                                 Objective    Vitals:    01/11/25 1300   BP: 108/73   BP Location: Left arm   Patient Position: Sitting   BP Cuff Size: Large adult   Pulse: 55   Resp: 18   Temp: 36.9 °C (98.5 °F)   TempSrc: Oral   SpO2: 99%   Weight: 101 kg (222 lb)   Height: 2.235 m (7' 4\")     No LMP for male patient.    Physical Exam  Vitals and nursing note reviewed.   Constitutional:       General: He is not in acute distress.     Appearance: Normal appearance. He is ill-appearing.   HENT:      Head: Atraumatic.      Right Ear: A middle ear effusion is present.      Left Ear: A middle ear effusion is present.      Nose: Mucosal edema and congestion present.      Right Turbinates: Swollen.      Left Turbinates: Swollen.      Mouth/Throat:      Lips: Pink.      Mouth: Mucous membranes are moist.      Pharynx: Uvula midline. Posterior oropharyngeal erythema and postnasal drip present.   Cardiovascular:      Rate and Rhythm: Normal rate and regular rhythm.      Pulses: Normal pulses.      Heart sounds: Normal heart sounds.   Pulmonary:      Effort: Pulmonary effort is normal. No respiratory distress.      Breath sounds: Normal air entry. Examination of the right-upper field reveals wheezing. Examination of the left-upper field reveals wheezing. Wheezing present. No decreased breath sounds or rhonchi.   Chest:      Chest wall: No tenderness.   Musculoskeletal:      Cervical back: Normal range of motion and neck supple.   Skin:     General: Skin is warm and dry.      Capillary Refill: Capillary refill takes less than 2 seconds.   Neurological:     "  Mental Status: He is alert and oriented to person, place, and time.   Psychiatric:         Behavior: Behavior normal.         Procedures    Point of Care Test & Imaging Results from this visit  No results found for this visit on 01/11/25.   No results found.    Diagnostic study results (if any) were reviewed by JULES Oro.    Assessment/Plan   Allergies, medications, history, and pertinent labs/EKGs/Imaging reviewed by JULES Oro.     Medical Decision Making  Risks, benefits, and alternatives of the medications and treatment plan prescribed today were discussed, and patient expressed understanding. Plan follow up as discussed or as needed if any worsening symptoms or change in condition. Reinforced red flags including (but not limited to): severe or worsening pain; difficulty swallowing; stiff neck; shortness of breath; coughing or vomiting blood; chest pain; and new or increased fever are indications to go to the Emergency Department.  At time of discharge patient was clinically well-appearing and HDS for outpatient management. The patient and/or family was educated regarding diagnosis, supportive care, OTC and Rx medications. The patient and/or family was given the opportunity to ask questions prior to discharge.  They verbalized understanding of my discussion of the plans for treatment, expected course, indications to return to  or seek further evaluation in ED, and the need for timely follow up as directed.   They were provided with a work/school excuse if requested. The after-visit summary was given to the patient and care instructions were reviewed with the patient. All questions were answered and the patient verbalized understanding of the plan of care for today.  Plan:  Recent visit notes reviewed  Meds as above  Increase clear fluids  Pcp follow up this week if not improving or worsening  ER visit anytime 24/7 for acute worsening or changing condition      Orders and  Diagnoses  Diagnoses and all orders for this visit:  Bronchitis  -     azithromycin (Zithromax) 250 mg tablet; Take 2 tablets (500 mg) by mouth once daily for 1 day, THEN 1 tablet (250 mg) once daily for 4 days.  -     albuterol 90 mcg/actuation inhaler; Inhale 2 puffs every 6 hours if needed for wheezing.  -     predniSONE (Deltasone) 10 mg tablet; Take 4 tablets (40 mg) by mouth once daily for 3 days, THEN 2 tablets (20 mg) once daily for 3 days, THEN 1 tablet (10 mg) once daily for 3 days.  -     benzonatate (Tessalon) 200 mg capsule; Take 1 capsule (200 mg) by mouth 3 times a day as needed for cough. Do not crush or chew.      Medical Admin Record      Patient disposition: Home    Electronically signed by JULES Oro  1:57 PM

## 2025-01-12 RX ORDER — LISDEXAMFETAMINE DIMESYLATE 30 MG/1
30 CAPSULE ORAL
Qty: 30 CAPSULE | Refills: 0 | Status: SHIPPED | OUTPATIENT
Start: 2025-01-12

## 2025-01-24 ENCOUNTER — APPOINTMENT (OUTPATIENT)
Dept: PRIMARY CARE | Facility: CLINIC | Age: 35
End: 2025-01-24
Payer: OTHER GOVERNMENT

## 2025-01-24 VITALS
OXYGEN SATURATION: 98 % | DIASTOLIC BLOOD PRESSURE: 68 MMHG | RESPIRATION RATE: 16 BRPM | HEIGHT: 73 IN | WEIGHT: 223 LBS | HEART RATE: 65 BPM | BODY MASS INDEX: 29.55 KG/M2 | TEMPERATURE: 98.4 F | SYSTOLIC BLOOD PRESSURE: 110 MMHG

## 2025-01-24 DIAGNOSIS — G43.511 MIGRAINE AURA, PERSISTENT, INTRACTABLE, WITH STATUS MIGRAINOSUS: ICD-10-CM

## 2025-01-24 DIAGNOSIS — G47.09 OTHER INSOMNIA: ICD-10-CM

## 2025-01-24 DIAGNOSIS — F90.9 ATTENTION DEFICIT HYPERACTIVITY DISORDER (ADHD), UNSPECIFIED ADHD TYPE: ICD-10-CM

## 2025-01-24 DIAGNOSIS — N52.9 ERECTILE DYSFUNCTION, UNSPECIFIED ERECTILE DYSFUNCTION TYPE: ICD-10-CM

## 2025-01-24 DIAGNOSIS — E78.5 DYSLIPIDEMIA: ICD-10-CM

## 2025-01-24 DIAGNOSIS — E55.9 VITAMIN D DEFICIENCY: ICD-10-CM

## 2025-01-24 DIAGNOSIS — Z79.899 MEDICATION MANAGEMENT: ICD-10-CM

## 2025-01-24 DIAGNOSIS — R53.83 FATIGUE, UNSPECIFIED TYPE: ICD-10-CM

## 2025-01-24 DIAGNOSIS — S06.9XAA TRAUMATIC BRAIN INJURY, WITH UNKNOWN LOSS OF CONSCIOUSNESS STATUS, INITIAL ENCOUNTER (MULTI): ICD-10-CM

## 2025-01-24 PROCEDURE — 99214 OFFICE O/P EST MOD 30 MIN: CPT | Performed by: FAMILY MEDICINE

## 2025-01-24 ASSESSMENT — ENCOUNTER SYMPTOMS
POLYDIPSIA: 0
PALPITATIONS: 0
APPETITE CHANGE: 0
EYE PAIN: 0
FATIGUE: 0
SLEEP DISTURBANCE: 0
ADENOPATHY: 0
DIARRHEA: 0
CONSTITUTIONAL NEGATIVE: 1
SINUS PRESSURE: 0
ACTIVITY CHANGE: 0
DECREASED CONCENTRATION: 0
TROUBLE SWALLOWING: 0
RHINORRHEA: 0
FLANK PAIN: 0
SHORTNESS OF BREATH: 0
COLOR CHANGE: 0
CHEST TIGHTNESS: 0
STRIDOR: 0
PHOTOPHOBIA: 0
NERVOUS/ANXIOUS: 0
DEPRESSION: 0
POLYPHAGIA: 0
HEADACHES: 0
DYSPHORIC MOOD: 0
DYSURIA: 0
DIZZINESS: 0
MYALGIAS: 0
COUGH: 0
FEVER: 0
HEMATURIA: 0
SINUS PAIN: 0
ABDOMINAL DISTENTION: 0
SPEECH DIFFICULTY: 0
ARTHRALGIAS: 0
NECK STIFFNESS: 0
RECTAL PAIN: 0
LOSS OF SENSATION IN FEET: 0
CONFUSION: 0
BLOOD IN STOOL: 0
SORE THROAT: 0
AGITATION: 0
OCCASIONAL FEELINGS OF UNSTEADINESS: 0
CONSTIPATION: 0
ABDOMINAL PAIN: 0
SEIZURES: 0

## 2025-01-24 ASSESSMENT — PATIENT HEALTH QUESTIONNAIRE - PHQ9
SUM OF ALL RESPONSES TO PHQ9 QUESTIONS 1 AND 2: 0
1. LITTLE INTEREST OR PLEASURE IN DOING THINGS: NOT AT ALL
2. FEELING DOWN, DEPRESSED OR HOPELESS: NOT AT ALL

## 2025-01-24 NOTE — PROGRESS NOTES
"Subjective   Patient ID: Robe Lr is a 34 y.o. male who presents for Med Management.    HPI   Patient is at the office today to follow up on medications. Patient reports that he is using Vyvanse 30 mg daily.    Patient reports he is also using medications to sleep. Patient states he is using Seroquel and Clonidine and no concerns reported today.    UDS requested today    CSA need PCP signature.  Review of Systems   Constitutional: Negative.  Negative for activity change, appetite change, fatigue and fever.   HENT:  Negative for congestion, dental problem, ear discharge, ear pain, mouth sores, rhinorrhea, sinus pressure, sinus pain, sore throat, tinnitus and trouble swallowing.    Eyes:  Negative for photophobia, pain and visual disturbance.   Respiratory:  Negative for cough, chest tightness, shortness of breath and stridor.    Cardiovascular:  Negative for chest pain and palpitations.   Gastrointestinal:  Negative for abdominal distention, abdominal pain, blood in stool, constipation, diarrhea and rectal pain.   Endocrine: Negative for cold intolerance, heat intolerance, polydipsia, polyphagia and polyuria.   Genitourinary:  Negative for dysuria, flank pain, hematuria and urgency.   Musculoskeletal:  Negative for arthralgias, gait problem, myalgias and neck stiffness.   Skin:  Negative for color change and rash.   Allergic/Immunologic: Negative for environmental allergies and food allergies.   Neurological:  Negative for dizziness, seizures, syncope, speech difficulty and headaches.   Hematological:  Negative for adenopathy.   Psychiatric/Behavioral:  Negative for agitation, confusion, decreased concentration, dysphoric mood and sleep disturbance. The patient is not nervous/anxious.        Objective   /68 (BP Location: Right arm, Patient Position: Sitting, BP Cuff Size: Large adult)   Pulse 65   Temp 36.9 °C (98.4 °F) (Temporal)   Resp 16   Ht 1.854 m (6' 1\")   Wt 101 kg (223 lb)   SpO2 98%   " BMI 29.42 kg/m²     Physical Exam  Vitals reviewed.   Constitutional:       General: He is not in acute distress.     Appearance: Normal appearance. He is normal weight. He is not ill-appearing or diaphoretic.   HENT:      Head: Normocephalic.      Right Ear: Tympanic membrane and external ear normal.      Left Ear: Tympanic membrane and external ear normal.      Nose: Nose normal. No congestion.      Mouth/Throat:      Pharynx: No posterior oropharyngeal erythema.   Eyes:      General:         Right eye: No discharge.         Left eye: No discharge.      Extraocular Movements: Extraocular movements intact.      Conjunctiva/sclera: Conjunctivae normal.      Pupils: Pupils are equal, round, and reactive to light.   Cardiovascular:      Rate and Rhythm: Normal rate and regular rhythm.      Pulses: Normal pulses.      Heart sounds: Normal heart sounds. No murmur heard.  Pulmonary:      Effort: Pulmonary effort is normal. No respiratory distress.      Breath sounds: Normal breath sounds. No wheezing or rales.   Chest:      Chest wall: No tenderness.   Abdominal:      General: Abdomen is flat. Bowel sounds are normal. There is no distension.      Palpations: There is no mass.      Tenderness: There is no abdominal tenderness. There is no guarding.   Musculoskeletal:         General: No tenderness. Normal range of motion.      Cervical back: Normal range of motion and neck supple. No tenderness.      Right lower leg: No edema.      Left lower leg: No edema.   Skin:     General: Skin is dry.      Coloration: Skin is not jaundiced.      Findings: No bruising, erythema or rash.   Neurological:      General: No focal deficit present.      Mental Status: He is alert and oriented to person, place, and time. Mental status is at baseline.      Cranial Nerves: No cranial nerve deficit.      Sensory: No sensory deficit.      Coordination: Coordination normal.      Gait: Gait normal.   Psychiatric:         Mood and Affect: Mood  normal.         Thought Content: Thought content normal.         Judgment: Judgment normal.         Assessment/Plan   Problem List Items Addressed This Visit             ICD-10-CM    TBI (traumatic brain injury) (Multi) S06.9XAA    Relevant Orders    Follow Up In [facility] - PCP - Established    ADHD F90.9    Relevant Orders    Follow Up In [facility] - PCP - Established    Migraine aura, persistent, intractable, with status migrainosus G43.511    Relevant Orders    Follow Up In [facility] - PCP - Established     Other Visit Diagnoses         Codes    Fatigue, unspecified type     R53.83    Relevant Orders    CBC and Auto Differential    Follow Up In [facility] - PCP - Established    Dyslipidemia     E78.5    Relevant Orders    Comprehensive Metabolic Panel    Lipid Panel    Follow Up In [facility] - PCP - Established    Vitamin D deficiency     E55.9    Relevant Orders    Vitamin D 25-Hydroxy,Total (for eval of Vitamin D levels)    Follow Up In [facility] - PCP - Established    Erectile dysfunction, unspecified erectile dysfunction type     N52.9    Relevant Orders    Follow Up In [facility] - PCP - Established    Medication management     Z79.899    Relevant Orders    Follow Up In [facility] - PCP - Established    Other insomnia     G47.09    Relevant Orders    Follow Up In [facility] - PCP - Established          Continue current medications and therapy for chronic medical conditions     High wt 275 lbs     Refill Vyvanse     UDS  Q 6 months at lab      BW now    Scribe Attestation  By signing my name below, I, Kinza Desai MA, Scribe   attest that this documentation has been prepared under the direction and in the presence of Earl Deutsch MD.    Continue current medications and therapy for chronic medical conditions     I have personally reviewed the [facility] report with the patient and have considered the risk  of abuse, addiction, dependence and diversion.     Patient's use of medication is allowing patient to be able to perform ADL's. Patient is always being evaluated for the possibility of lowering the medication dosage.    All medical record entries made by the Scribe were at my direction and personally dictated by me. I have reviewed the chart and agree that the record accurately reflects my personal performance of the history, physical exam, discussion and plan.

## 2025-02-09 DIAGNOSIS — F90.9 ATTENTION DEFICIT HYPERACTIVITY DISORDER (ADHD), UNSPECIFIED ADHD TYPE: ICD-10-CM

## 2025-02-10 DIAGNOSIS — F90.9 ATTENTION DEFICIT HYPERACTIVITY DISORDER (ADHD), UNSPECIFIED ADHD TYPE: ICD-10-CM

## 2025-02-10 NOTE — TELEPHONE ENCOUNTER
Recent Visits  Date Type Provider Dept   01/24/25 Office Visit Earl Deutsch MD [facility]   10/04/24 Office Visit Earl Deutsch MD [facility]   Showing recent visits within past 180 days and meeting all other requirements  Future Appointments  Date Type Provider Dept   04/25/25 [facility]   Showing future appointments within next 90 days and meeting all other requirements

## 2025-02-11 RX ORDER — QUETIAPINE FUMARATE 50 MG/1
TABLET, FILM COATED ORAL
Qty: 90 TABLET | Refills: 0 | Status: SHIPPED | OUTPATIENT
Start: 2025-02-11

## 2025-02-11 RX ORDER — CLONIDINE HYDROCHLORIDE 0.2 MG/1
TABLET ORAL
Qty: 90 TABLET | Refills: 1 | Status: SHIPPED | OUTPATIENT
Start: 2025-02-11

## 2025-02-13 RX ORDER — LISDEXAMFETAMINE DIMESYLATE 30 MG/1
30 CAPSULE ORAL
Qty: 30 CAPSULE | Refills: 0 | Status: SHIPPED | OUTPATIENT
Start: 2025-02-13

## 2025-03-12 DIAGNOSIS — F90.9 ATTENTION DEFICIT HYPERACTIVITY DISORDER (ADHD), UNSPECIFIED ADHD TYPE: ICD-10-CM

## 2025-03-12 NOTE — TELEPHONE ENCOUNTER
REFILL REQUEST    lisdexamfetamine (Vyvanse) 30 mg capsule     Pharmacy    [id number] - [place], [place] - [address] AT [facility]  [address] [place] [place], [place] [address]  Phone: [phone]  Fax: [phone]

## 2025-03-14 ENCOUNTER — OFFICE VISIT (OUTPATIENT)
Dept: PRIMARY CARE | Facility: CLINIC | Age: 35
End: 2025-03-14
Payer: OTHER GOVERNMENT

## 2025-03-14 VITALS
BODY MASS INDEX: 30.09 KG/M2 | TEMPERATURE: 98 F | HEIGHT: 73 IN | HEART RATE: 58 BPM | DIASTOLIC BLOOD PRESSURE: 78 MMHG | SYSTOLIC BLOOD PRESSURE: 124 MMHG | RESPIRATION RATE: 16 BRPM | OXYGEN SATURATION: 99 % | WEIGHT: 227 LBS

## 2025-03-14 DIAGNOSIS — S69.91XA HAND INJURY, RIGHT, INITIAL ENCOUNTER: ICD-10-CM

## 2025-03-14 DIAGNOSIS — M79.641 HAND PAIN, RIGHT: Primary | ICD-10-CM

## 2025-03-14 PROCEDURE — 99213 OFFICE O/P EST LOW 20 MIN: CPT | Performed by: NURSE PRACTITIONER

## 2025-03-14 RX ORDER — LISDEXAMFETAMINE DIMESYLATE 30 MG/1
30 CAPSULE ORAL
Qty: 30 CAPSULE | Refills: 0 | Status: SHIPPED | OUTPATIENT
Start: 2025-03-14

## 2025-03-14 RX ORDER — DICLOFENAC SODIUM 75 MG/1
75 TABLET, DELAYED RELEASE ORAL 2 TIMES DAILY PRN
Qty: 60 TABLET | Refills: 0 | Status: SHIPPED | OUTPATIENT
Start: 2025-03-14 | End: 2025-05-13

## 2025-03-14 ASSESSMENT — ENCOUNTER SYMPTOMS
PALPITATIONS: 0
CONSTIPATION: 0
DIZZINESS: 0
WEAKNESS: 1
NUMBNESS: 0
VOMITING: 0
WHEEZING: 0
ABDOMINAL PAIN: 0
FEVER: 0
NAUSEA: 0
SHORTNESS OF BREATH: 0
FATIGUE: 0
TREMORS: 0
COUGH: 0
HEADACHES: 0
DIARRHEA: 0
CHILLS: 0

## 2025-03-14 NOTE — PROGRESS NOTES
"Subjective   Patient ID: Robe Lr is a 34 y.o. male who presents for Hand Pain.    Patient is being seen today for right hand pain, He states he was working out in the gym and shortly after he started feeling pain. He has tried taking tylenol and NSAID'S for the pain with little relief. He would like a referral to get an X-ray and this pain has been bothering him for about a month.     Hand Pain   The incident occurred more than 1 week ago (1 month ago). The incident occurred at the gym. Injury mechanism: putting down a weight. The pain is present in the right hand. The quality of the pain is described as aching (sharp at times). The pain is moderate. The pain has been Constant since the incident. Pertinent negatives include no chest pain or numbness. He has tried acetaminophen, immobilization and NSAIDs (wrist brace) for the symptoms. The treatment provided mild relief.      Pain will worsen when he is grabbing objects. At times, he will feel pain in his right wrist as well. He has been continuing to work out. He feels that movement and working out improves his pain.   He is right handed.    Review of Systems   Constitutional:  Negative for chills, fatigue and fever.   Respiratory:  Negative for cough, shortness of breath and wheezing.    Cardiovascular:  Negative for chest pain and palpitations.   Gastrointestinal:  Negative for abdominal pain, constipation, diarrhea, nausea and vomiting.   Musculoskeletal:         Right hand pain   Skin:  Negative for rash.   Neurological:  Positive for weakness. Negative for dizziness, tremors, numbness and headaches.       Objective   /78 (BP Location: Right arm, Patient Position: Sitting, BP Cuff Size: Large adult)   Pulse 58   Temp 36.7 °C (98 °F) (Temporal)   Resp 16   Ht 1.854 m (6' 1\")   Wt 103 kg (227 lb)   SpO2 99%   BMI 29.95 kg/m²     Physical Exam  Vitals and nursing note reviewed.   Constitutional:       General: He is not in acute distress.     " Appearance: Normal appearance.   Cardiovascular:      Rate and Rhythm: Normal rate and regular rhythm.      Heart sounds: Normal heart sounds.   Pulmonary:      Effort: Pulmonary effort is normal.      Breath sounds: Normal breath sounds. No wheezing, rhonchi or rales.   Musculoskeletal:      Right hand: No swelling or tenderness. Normal range of motion. Decreased strength. Normal sensation. Normal capillary refill. Normal pulse.   Skin:     General: Skin is warm and dry.      Capillary Refill: Capillary refill takes less than 2 seconds.   Neurological:      Mental Status: He is alert and oriented to person, place, and time.   Psychiatric:         Mood and Affect: Mood normal.         Behavior: Behavior normal.         Assessment/Plan   Problem List Items Addressed This Visit    None  Visit Diagnoses         Codes    Hand pain, right    -  Primary M79.641    Relevant Medications    diclofenac (Voltaren) 75 mg EC tablet    Other Relevant Orders    XR hand right 3+ views    Hand injury, right, initial encounter     S69.91XA        Check xray.   Rest the hand.   Continue with brace as needed.   Start Diclofenac as directed.   Follow up in 2 weeks for recheck, or sooner with any additional concerns.

## 2025-03-19 ENCOUNTER — HOSPITAL ENCOUNTER (OUTPATIENT)
Dept: RADIOLOGY | Facility: CLINIC | Age: 35
Discharge: HOME | End: 2025-03-19
Payer: OTHER GOVERNMENT

## 2025-03-19 DIAGNOSIS — M79.641 HAND PAIN, RIGHT: ICD-10-CM

## 2025-03-19 PROCEDURE — 73130 X-RAY EXAM OF HAND: CPT | Mod: RIGHT SIDE | Performed by: RADIOLOGY

## 2025-03-19 PROCEDURE — 73130 X-RAY EXAM OF HAND: CPT | Mod: RT

## 2025-04-11 DIAGNOSIS — F90.9 ATTENTION DEFICIT HYPERACTIVITY DISORDER (ADHD), UNSPECIFIED ADHD TYPE: ICD-10-CM

## 2025-04-11 NOTE — TELEPHONE ENCOUNTER
Dr. Deutsch Pt    Refill for  lisdexamfetamine (Vyvanse) 30 mg capsule      [facility] #[id number] [facility] [address] AT [facility]

## 2025-04-13 RX ORDER — LISDEXAMFETAMINE DIMESYLATE 30 MG/1
30 CAPSULE ORAL
Qty: 30 CAPSULE | Refills: 0 | Status: SHIPPED | OUTPATIENT
Start: 2025-04-13

## 2025-04-25 ENCOUNTER — APPOINTMENT (OUTPATIENT)
Dept: PRIMARY CARE | Facility: CLINIC | Age: 35
End: 2025-04-25
Payer: OTHER GOVERNMENT

## 2025-04-25 VITALS
HEIGHT: 73 IN | SYSTOLIC BLOOD PRESSURE: 122 MMHG | TEMPERATURE: 97.9 F | DIASTOLIC BLOOD PRESSURE: 74 MMHG | RESPIRATION RATE: 16 BRPM | OXYGEN SATURATION: 98 % | WEIGHT: 225.4 LBS | BODY MASS INDEX: 29.87 KG/M2 | HEART RATE: 62 BPM

## 2025-04-25 DIAGNOSIS — G47.09 OTHER INSOMNIA: ICD-10-CM

## 2025-04-25 DIAGNOSIS — M79.641 HAND PAIN, RIGHT: ICD-10-CM

## 2025-04-25 DIAGNOSIS — G43.511 MIGRAINE AURA, PERSISTENT, INTRACTABLE, WITH STATUS MIGRAINOSUS: ICD-10-CM

## 2025-04-25 DIAGNOSIS — E78.5 DYSLIPIDEMIA: ICD-10-CM

## 2025-04-25 DIAGNOSIS — Z79.899 MEDICATION MANAGEMENT: ICD-10-CM

## 2025-04-25 DIAGNOSIS — Z00.00 HEALTH MAINTENANCE EXAMINATION: Primary | ICD-10-CM

## 2025-04-25 DIAGNOSIS — F90.9 ATTENTION DEFICIT HYPERACTIVITY DISORDER (ADHD), UNSPECIFIED ADHD TYPE: ICD-10-CM

## 2025-04-25 DIAGNOSIS — N52.9 ERECTILE DYSFUNCTION, UNSPECIFIED ERECTILE DYSFUNCTION TYPE: ICD-10-CM

## 2025-04-25 DIAGNOSIS — E55.9 VITAMIN D DEFICIENCY: ICD-10-CM

## 2025-04-25 DIAGNOSIS — S06.9XAA TRAUMATIC BRAIN INJURY, WITH UNKNOWN LOSS OF CONSCIOUSNESS STATUS, INITIAL ENCOUNTER (MULTI): ICD-10-CM

## 2025-04-25 DIAGNOSIS — R53.83 FATIGUE, UNSPECIFIED TYPE: ICD-10-CM

## 2025-04-25 PROCEDURE — 99214 OFFICE O/P EST MOD 30 MIN: CPT | Performed by: FAMILY MEDICINE

## 2025-04-25 RX ORDER — SUMATRIPTAN SUCCINATE 100 MG/1
100 TABLET ORAL ONCE AS NEEDED
Start: 2025-04-25 | End: 2026-04-25

## 2025-04-25 RX ORDER — TADALAFIL 10 MG/1
10 TABLET ORAL DAILY PRN
Qty: 10 TABLET | Refills: 1 | Status: SHIPPED | OUTPATIENT
Start: 2025-04-25 | End: 2025-05-25

## 2025-04-25 RX ORDER — DICLOFENAC SODIUM 75 MG/1
75 TABLET, DELAYED RELEASE ORAL 2 TIMES DAILY PRN
Start: 2025-04-25 | End: 2025-06-24

## 2025-04-25 ASSESSMENT — PATIENT HEALTH QUESTIONNAIRE - PHQ9
2. FEELING DOWN, DEPRESSED OR HOPELESS: NOT AT ALL
SUM OF ALL RESPONSES TO PHQ9 QUESTIONS 1 AND 2: 0
1. LITTLE INTEREST OR PLEASURE IN DOING THINGS: NOT AT ALL

## 2025-04-25 ASSESSMENT — ENCOUNTER SYMPTOMS
OCCASIONAL FEELINGS OF UNSTEADINESS: 0
LOSS OF SENSATION IN FEET: 0
DEPRESSION: 0

## 2025-04-25 NOTE — PROGRESS NOTES
Subjective   Patient ID: Robe Lr is a 34 y.o. male who presents for Follow-up (3 month follow up).  History of Present Illness  Robe Lr is a 34 year old male who presents with right hand pain and weakness.    He has been experiencing pain and weakness in his right hand for the past one to two months. An x-ray was performed initially, which showed no abnormalities. The pain is described as radiating and worsens when spreading his fingers. His right hand is weaker compared to his left, especially during rubber band exercises. No symptoms consistent with carpal tunnel syndrome. He has been using diclofenac, but it does not significantly alleviate the pain.    He has a history of pneumonia a few months ago, for which he used albuterol post-recovery but no longer requires it. No history of asthma or chronic respiratory issues. No current respiratory issues and does not require albuterol.    He experiences migraines and uses sumatriptan as needed, which effectively relieves his symptoms.    He is currently taking quetiapine and clonidine for stress and sleep, which he finds effective. He has been on the same dosage for an extended period without needing adjustments.    He has a history of left knee surgery performed just before the COVID-19 pandemic, which limited his access to physical therapy. He reports swelling and stiffness in the knee, particularly after exercise, and uses an ice pack for relief. He mentions a lack of full range of motion at times.    He has been prescribed Viagra for erectile dysfunction but experiences severe headaches as a side effect, even at half doses. He has tried it a few times but finds the side effects intolerable.    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    Review of Systems  12 Systems have been reviewed as follows.  Constitutional: Fever, weight gain, weight loss, appetite change, night  "sweats, fatigue, chills.  Eyes : blurry, double vision, vision, loss, tearing, redness, pain, sensitivity to light, glaucoma.  Ears, nose, mouth, and throat: Hearing loss, ringing in the ears, ear pain, nasal congestion, nasal drainage, nosebleeds, mouth, throat, irritation tooth problem.  Cardiovascular :chest pain, pressure, heart racing, palpitations, sweating, leg swelling, high or low blood pressure  Pulmonary: Cough, yellow or green sputum, blood and sputum, shortness of breath, wheezing  Gastrointestinal: Nausea, vomiting, diarrhea, constipation, pain, blood in stool, or vomitus, heartburn, difficulty swallowing  Genitourinary: incontinence, abnormal bleeding, abnormal discharge, urinary frequency, urinary hesitancy, pain, impotence sexual problem, infection, urinary retention  Musculoskeletal: Pain, stiffness, joint, redness or warmth, arthritis, back pain, weakness, muscle wasting, sprain or fracture  Neuro: Weight weakness, dizziness, change in voice, change in taste change in vision, change in hearing, loss, or change of sensation, trouble walking, balance problems coordination problems, shaking, speech problem  Endocrine , cold or heat intolerance, blood sugar problem, weight gain or loss missed periods hot flashes, sweats, change in body hair, change in libido, increased thirst, increased urination  Heme/lymph: Swelling, bleeding, problem anemia, bruising, enlarged lymph nodes  Allergic/immunologic: H. plus nasal drip, watery itchy eyes, nasal drainage, immunosuppressed  The above were reviewed and noted negative except as noted in HPI and Problem List.    Objective     /74 (BP Location: Right arm, Patient Position: Sitting, BP Cuff Size: Adult)   Pulse 62   Temp 36.6 °C (97.9 °F) (Temporal)   Resp 16   Ht 1.854 m (6' 1\")   Wt 102 kg (225 lb 6.4 oz)   SpO2 98%   BMI 29.74 kg/m²      Physical Exam  MEASUREMENTS: Weight- 275.  GENERAL: Alert, cooperative, well developed, no acute " distress.  HEENT: Normocephalic, normal oropharynx, moist mucous membranes.  CHEST: Clear to auscultation bilaterally. No wheezes, rhonchi, or crackles.  CARDIOVASCULAR: Normal heart rate and rhythm. S1 and S2 normal without murmurs.  ABDOMEN: Soft, non-tender, non-distended, without organomegaly. Normal bowel sounds.  EXTREMITIES: No cyanosis or edema.  MUSCULOSKELETAL: Right hand exhibits weakness compared to the left hand.  NEUROLOGICAL: Cranial nerves grossly intact. Moves all extremities without gross motor or sensory deficit.  Constitutional: Well developed, well nourished, alert and in no acute distress   Eyes: Normal external exam. Pupils equally round and reactive to light with normal accommodation and extraocular movements intact.  Neck: Supple, no lymphadenopathy or masses.   Cardiovascular: Regular rate and rhythm, normal S1 and S2, no murmurs, gallops, or rubs. Radial pulses normal. No peripheral edema.  Pulmonary: No respiratory distress, lungs clear to auscultation bilaterally. No wheezes, rhonchi, rales.  Abdomen: soft,non tender, non distended, without masses or HSM  Skin: Warm, well perfused, normal skin turgor and color.   Neurologic: Cranial nerves II-XII grossly intact.   Psychiatric: Mood calm and affect normal  Musculoskeletal: Moving all extremities without restriction  The above were reviewed and noted negative except as noted in HPI and Problem List.      Results  RADIOLOGY  Hand X-ray: Normal (02/25/2025)         Assessment & Plan  Right hand pain  Intermittent right hand pain with radiating sensation, not consistent with carpal tunnel syndrome. Possible tendonitis considered, but not definitive. Previous x-ray showed no abnormalities. Reports weakness in the right hand compared to the left and uses weighted rubber bands for exercises.  - Perform three sets of fifteen exercises with palms up and down daily.  - Take diclofenac twice a day for a week, then as needed for pain and healing.  -  Continue using weighted rubber bands for hand exercises, ensuring equal exercise for both hands.    Left knee arthritis  Left knee arthritis with swelling and stiffness, especially post-exercise. Previous knee surgery with limited physical therapy due to COVID-19 restrictions. Reports occasional loss of full range of motion and uses an ice pack for relief.  - Perform three sets of fifteen straight leg raises daily to strengthen quads and reduce pressure on the knee.  - Use diclofenac as needed for swelling and stiffness.  - Apply ice pack to the knee as needed for swelling and stiffness.    Migraine  Migraine managed with sumatriptan as needed, effectively alleviating symptoms. [facility]    Erectile dysfunction  Erectile dysfunction previously managed with Viagra, which caused severe headaches. Willing to try Cialis as an alternative. Discussed using a lower dose to minimize side effects.  - Prescribe Cialis 10 mg, instruct to try half a tablet initially to assess tolerance.    Attention deficit disorder  Attention deficit disorder managed with Vyvanse, which is effective. Recent refill confirmed.    Insomnia and stress  Insomnia and stress managed with quetiapine and clonidine, which are effective. Reports good sleep and stress management with current regimen.    General health maintenance  Has not completed previously ordered blood work due to time constraints. Blood work orders will be renewed.  - Renew blood work orders and advise completion within six months.    Problem List Items Addressed This Visit       TBI (traumatic brain injury) (Multi)    Relevant Orders    Follow Up In [facility] - PCP - Established    ADHD    Relevant Orders    Follow Up In [facility] - PCP - Established    Migraine aura, persistent, intractable, with status migrainosus    Relevant Medications    SUMAtriptan (Imitrex) 100 mg tablet    Other Relevant Orders    Follow Up In  [facility] - PCP - Established     Other Visit Diagnoses         Health maintenance examination    -  Primary    Relevant Orders    CBC and Auto Differential    Lipid Panel    Comprehensive Metabolic Panel    Vitamin D 25-Hydroxy,Total (for eval of Vitamin D levels)    Follow Up In [facility] - PCP - Established      Fatigue, unspecified type        Relevant Orders    CBC and Auto Differential    Follow Up In [facility] - PCP - Established      Dyslipidemia        Relevant Orders    Lipid Panel    Comprehensive Metabolic Panel    Follow Up In [facility] - PCP - Established      Vitamin D deficiency        Relevant Orders    Vitamin D 25-Hydroxy,Total (for eval of Vitamin D levels)    Follow Up In [facility] - PCP - Established      Erectile dysfunction, unspecified erectile dysfunction type        Relevant Medications    tadalafil (Cialis) 10 mg tablet    Other Relevant Orders    Follow Up In [facility] - PCP - Established      Medication management        Relevant Orders    Opiate/Opioid/Benzo Prescription Compliance    Follow Up In [facility] - PCP - Established      Other insomnia        Relevant Orders    Follow Up In [facility] - PCP - Established      Hand pain, right        Relevant Medications    diclofenac (Voltaren) 75 mg EC tablet    Other Relevant Orders    Follow Up In [facility] - PCP - Established          High wt 275 lbs      Continue current medications and therapy for chronic medical conditions     I have personally reviewed the [facility] report with the patient and have considered the risk of abuse, addiction, dependence and diversion.     Patient's use of medication is allowing patient to be able to perform ADL's. Patient is always being evaluated for the possibility of lowering the medication dosage.       Earl Deutsch MD       This medical note was created with the assistance of artificial  intelligence (AI) for documentation purposes. The content has been reviewed and confirmed by the healthcare provider for accuracy and completeness. Patient consented to the use of audio recording and use of AI during their visit.

## 2025-04-26 ENCOUNTER — TELEPHONE (OUTPATIENT)
Dept: PRIMARY CARE | Facility: CLINIC | Age: 35
End: 2025-04-26
Payer: OTHER GOVERNMENT

## 2025-04-26 NOTE — TELEPHONE ENCOUNTER
Patient of Dr. La Nena Mckenzie submitted for tadalafil     It is approved through [facility] from 03/26/2025 until 12/31/2099  Case id [id number]

## 2025-04-27 LAB
1OH-MIDAZOLAM UR-MCNC: NEGATIVE NG/ML
7AMINOCLONAZEPAM UR-MCNC: NEGATIVE NG/ML
A-OH ALPRAZ UR-MCNC: NEGATIVE NG/ML
A-OH-TRIAZOLAM UR-MCNC: NEGATIVE NG/ML
AMOBARBITAL UR-MCNC: NORMAL NG/ML
AMPHET UR-MCNC: NORMAL NG/ML
AMPHETAMINES UR QL: NORMAL
BARBITURATES UR QL: NORMAL
BUTALBITAL UR-MCNC: NORMAL NG/ML
BZE UR QL: NORMAL
BZE UR-MCNC: NORMAL MG/L
CODEINE UR-MCNC: NEGATIVE NG/ML
CREAT UR-MCNC: NORMAL MG/DL
DRUG SCREEN COMMENT UR-IMP: NORMAL
EDDP UR-MCNC: NEGATIVE NG/ML
FENTANYL UR-MCNC: NEGATIVE NG/ML
HYDROCODONE UR-MCNC: NEGATIVE NG/ML
HYDROMORPHONE UR-MCNC: NEGATIVE NG/ML
LORAZEPAM UR-MCNC: NEGATIVE NG/ML
METHADONE UR-MCNC: NEGATIVE NG/ML
METHAMPHET UR-MCNC: NORMAL UG/ML
MORPHINE UR-MCNC: NEGATIVE NG/ML
NORDIAZEPAM UR-MCNC: NEGATIVE NG/ML
NORFENTANYL UR-MCNC: NEGATIVE NG/ML
NORHYDROCODONE UR CFM-MCNC: NEGATIVE NG/ML
NOROXYCODONE UR CFM-MCNC: NEGATIVE NG/ML
NORTRAMADOL UR-MCNC: NEGATIVE NG/ML
OH-ETHYLFLURAZ UR-MCNC: NEGATIVE NG/ML
OXAZEPAM UR-MCNC: NEGATIVE NG/ML
OXIDANTS UR QL: NORMAL
OXYCODONE UR CFM-MCNC: NEGATIVE NG/ML
OXYMORPHONE UR CFM-MCNC: NEGATIVE NG/ML
PCP UR QL: NORMAL
PCP UR-MCNC: NORMAL UG/L
PENTOBARB UR-MCNC: NORMAL UG/ML
PH UR: NORMAL [PH]
PHENOBARB UR-MCNC: NORMAL UG/ML
QUEST 6 ACETYLMORPHINE: NEGATIVE NG/ML
QUEST ABNORMAL SPECIMEN VALIDITY TEST:: NORMAL
QUEST NOTES AND COMMENTS: NORMAL
QUEST PATIENT HISTORICAL REPORT: NORMAL
QUEST ZOLPIDEM: NEGATIVE NG/ML
SECOBARBITAL UR-MCNC: NORMAL UG/ML
SP GR UR: NORMAL
TEMAZEPAM UR-MCNC: NEGATIVE NG/ML
THC UR QL: NORMAL
THC UR-MCNC: NORMAL NG/ML
TRAMADOL UR-MCNC: NEGATIVE NG/ML
ZOLPIDEM PHENYL-4-CARB UR CFM-MCNC: NEGATIVE NG/ML

## 2025-04-28 LAB
1OH-MIDAZOLAM UR-MCNC: NEGATIVE NG/ML
7AMINOCLONAZEPAM UR-MCNC: NEGATIVE NG/ML
A-OH ALPRAZ UR-MCNC: NEGATIVE NG/ML
A-OH-TRIAZOLAM UR-MCNC: NEGATIVE NG/ML
AMPHET UR-MCNC: 1728 NG/ML
AMPHETAMINES UR QL: POSITIVE NG/ML
BARBITURATES UR QL: NEGATIVE NG/ML
BZE UR QL: NEGATIVE NG/ML
CODEINE UR-MCNC: NEGATIVE NG/ML
CREAT UR-MCNC: 56.4 MG/DL
DRUG SCREEN COMMENT UR-IMP: ABNORMAL
EDDP UR-MCNC: NEGATIVE NG/ML
FENTANYL UR-MCNC: NEGATIVE NG/ML
HYDROCODONE UR-MCNC: NEGATIVE NG/ML
HYDROMORPHONE UR-MCNC: NEGATIVE NG/ML
LORAZEPAM UR-MCNC: NEGATIVE NG/ML
METHADONE UR-MCNC: NEGATIVE NG/ML
METHAMPHET UR-MCNC: NEGATIVE NG/ML
MORPHINE UR-MCNC: NEGATIVE NG/ML
NORDIAZEPAM UR-MCNC: NEGATIVE NG/ML
NORFENTANYL UR-MCNC: NEGATIVE NG/ML
NORHYDROCODONE UR CFM-MCNC: NEGATIVE NG/ML
NOROXYCODONE UR CFM-MCNC: NEGATIVE NG/ML
NORTRAMADOL UR-MCNC: NEGATIVE NG/ML
OH-ETHYLFLURAZ UR-MCNC: NEGATIVE NG/ML
OXAZEPAM UR-MCNC: NEGATIVE NG/ML
OXIDANTS UR QL: NEGATIVE MCG/ML
OXYCODONE UR CFM-MCNC: NEGATIVE NG/ML
OXYMORPHONE UR CFM-MCNC: NEGATIVE NG/ML
PCP UR QL: NEGATIVE NG/ML
PH UR: 5.8 [PH] (ref 4.5–9)
QUEST 6 ACETYLMORPHINE: NEGATIVE NG/ML
QUEST NOTES AND COMMENTS: ABNORMAL
QUEST ZOLPIDEM: NEGATIVE NG/ML
TEMAZEPAM UR-MCNC: NEGATIVE NG/ML
THC UR QL: NEGATIVE NG/ML
TRAMADOL UR-MCNC: NEGATIVE NG/ML
ZOLPIDEM PHENYL-4-CARB UR CFM-MCNC: NEGATIVE NG/ML

## 2025-05-10 DIAGNOSIS — F90.9 ATTENTION DEFICIT HYPERACTIVITY DISORDER (ADHD), UNSPECIFIED ADHD TYPE: ICD-10-CM

## 2025-05-12 DIAGNOSIS — F90.9 ATTENTION DEFICIT HYPERACTIVITY DISORDER (ADHD), UNSPECIFIED ADHD TYPE: ICD-10-CM

## 2025-05-12 NOTE — TELEPHONE ENCOUNTER
Recent Visits  Date Type Provider Dept   04[facility]25/25 [facility]   Showing recent visits within past 90 days and meeting all other requirements  Future Appointments  Date Type Provider Dept   07/25/25 Appointment Earl Deutsch MD [facility]   Showing future appointments within next 90 days and meeting all other requirements

## 2025-05-12 NOTE — TELEPHONE ENCOUNTER
Requests:  lisdexamfetamine (Vyvanse) 30 mg capsule     Sent to:  [facility] in [place]

## 2025-05-13 RX ORDER — QUETIAPINE FUMARATE 50 MG/1
TABLET, FILM COATED ORAL
Qty: 90 TABLET | Refills: 0 | Status: SHIPPED | OUTPATIENT
Start: 2025-05-13

## 2025-05-14 RX ORDER — LISDEXAMFETAMINE DIMESYLATE 30 MG/1
30 CAPSULE ORAL
Qty: 30 CAPSULE | Refills: 0 | Status: SHIPPED | OUTPATIENT
Start: 2025-05-14

## 2025-06-06 ENCOUNTER — TELEPHONE (OUTPATIENT)
Dept: PRIMARY CARE | Facility: CLINIC | Age: 35
End: 2025-06-06
Payer: OTHER GOVERNMENT

## 2025-06-06 DIAGNOSIS — M79.641 HAND PAIN, RIGHT: ICD-10-CM

## 2025-06-06 NOTE — TELEPHONE ENCOUNTER
Pt is having hand pain, he was seen by CB for it and got an xray that didn't show anything, nut it has not gone away.    He would like a referral to a hand specialist.    Please advise and call pt

## 2025-06-13 DIAGNOSIS — F90.9 ATTENTION DEFICIT HYPERACTIVITY DISORDER (ADHD), UNSPECIFIED ADHD TYPE: ICD-10-CM

## 2025-06-13 NOTE — TELEPHONE ENCOUNTER
Recent Visits  Date Type Provider Dept   04/25/25 Office Visit Earl Deutsch MD [facility]   01/24/25 Office Visit Earl Deutsch MD [facility]   Showing recent visits within past 180 days and meeting all other requirements  Future Appointments  Date Type Provider Dept   07/25/25 Appointment Earl Deutsch MD [facility]   Showing future appointments within next 90 days and meeting all other requirements

## 2025-06-16 RX ORDER — LISDEXAMFETAMINE DIMESYLATE 30 MG/1
30 CAPSULE ORAL
Qty: 30 CAPSULE | Refills: 0 | Status: SHIPPED | OUTPATIENT
Start: 2025-06-16

## 2025-06-30 ENCOUNTER — APPOINTMENT (OUTPATIENT)
Dept: ORTHOPEDIC SURGERY | Facility: CLINIC | Age: 35
End: 2025-06-30
Payer: OTHER GOVERNMENT

## 2025-06-30 DIAGNOSIS — M79.641 HAND PAIN, RIGHT: ICD-10-CM

## 2025-06-30 PROCEDURE — 99203 OFFICE O/P NEW LOW 30 MIN: CPT | Performed by: STUDENT IN AN ORGANIZED HEALTH CARE EDUCATION/TRAINING PROGRAM

## 2025-06-30 NOTE — PROGRESS NOTES
Assessment     Robe is a 35 y.o. male with significant past medical history of ADHD,  Hx of brain injury with vertigo (TBI) and spine injury (cervical sprain) -- fell 30 feet onto his head while in the [employer]-- chronic head and neck pain,, who presents with right hand pain and finger weakness  .  The patient's symptoms, clinical exam and imaging studies are suggestive of posterior interosseus strain.   Plan     At this time, I would like to make the following recommendation/plan:  -  Physical Therapy: provided Home exercise program  -  Medication: already improving with oral steroids   -  Studies: Interpreted: Ultrasound of hand performed in office notable neovascularization of posterior interossei     Time Spent  Prep time on day of patient encounter: 2 minutes  Time spent directly with patient, family or caregiver: 27 minutes  Additional Time Spent on Patient Care Activities: 8 minutes  Documentation Time: 3 minutes      Follow up:  Follow up in prn.    Subjective    Chief Complaint: right hand pain and weakness in fingers    History of Present Illness:  Robe Lr is a RHD 35 y.o. male, works in [employer], with pertinent PMH of ADHD,  Hx of brain injury with vertigo (TBI) and spine injury (cervical sprain) -- fell 30 feet onto his head while in the [employer]-- chronic head and neck pain, presents today for right hand pain.   Pain first started on 02/2025, when he lowered a weight to the ground. The pain as located in the lateral dorsal ulnar surface of the right hand hypothenar.     Pain:        Severity:  Robe Lr states pain is: 8/10 at it's worst. On average pain is 2/10 (Scale 0-no pain, 10-worst pain)      Quality:  dull, aching, and throbbing.       Radiating: up th elbow to the shoulder      Aggravating Factors: rotation the wrist in ulnar and radial deviation, using the rower, gropping with thumb       Alleviating Factors:  steroids , stops exacerbating activity       Time of day: worse first thing in  the morning.       Associated symptoms:  no numbness, no tingling; chronic balance problems     Physical Therapy/Occupational Therapy/Other Modalities:  hasn't tired      Topicals:   ICE/Heat: heat helps temporarily   Topicals (Capsicin/Diclofenac gel/Salonpas/Lidocaine): hasn't tired     Medications:   - Over the counter : (Tylenol, NSAIDs)  ibuprofen mildly  helpful   - Steroid: steroid helpful   - Gabapentin/Lyrica: hasn't tired   - Muscle relaxer: methocarbamol not helpful   - Duloxetine/Topamax/oxcarbazepine: hasn't tired   - Opiates:hasn't tired     Current Medications[1]    RX Allergies[2]    Injections:    Date/Injection Type/Location/%relief/ Lasting  - no hc of CSI  Surgery:  Date/Type/Location/%relief/ Lasting  - 2019 left knee ACL reconstruction     Surgical History[3]    Medical History[4]      ROS:  - Sleep: Sleep isn't disrupted secondary to pain  - Bowel/Bladder: Denies bowel/bladder dysfunction  - Falls: Denies fall  - Weight changes: reports intentional 30 lbs wt loss   - Mood/Psych: Denies any feelings of anxiety or depression    12-point review of systems was completed and is otherwise negative except as noted in the HPI.      Social Hx:  - Home: Lives with wife in a house.   - ADLs: Independent in ADLs and ambulation without assistive device.   - Hobbies: gym/exercise (modified-- thumb-less / lower weight), eat food, eat food  - Work:  It  - Smoking/Alcohol/Drugs: No/rarely/No    Objective     Physical Exam:  General:  Appears state age, in NAD, and overweight  Psychological:  Normal mood and affect  Pulm:  Breathing comfortably on RA  LUNG: Nonlabored breathing  HEART: No clubbing or cyanosis  SKIN: No increased erythema, warmth, rashes, or concerning skin lesions  NEURO: Sensation is intact in the bilateral upper extremities. Strength is grossly 5 out of 5 throughout the bilateral upper extremities, unless noted below.  GAIT: Non-antalgic.  Examination of the right wrist and hand: Wrist  range of motion is full and pain free. No swelling or ecchymosis. No thenar or hypothenar atrophy. No tenderness with palpation of the wrist joint, TFCC region, ECU tendon, 1st dorsal compartment, 1st CMC joint or STT joint. No tenderness with palpation of the scaphoid. CMC grind test is negative. Finklestein's test is negative. Negative Tinel's over the median nerve at the wrist.  Negative Phalen's and negative carpal tunnel compression test. Pain with finger abduction.    Imaging and Other Studies:    Imaging  No results found.    Cardiology, Vascular, and Other Imaging  No other imaging results found for the past 7 days           [1]   Current Outpatient Medications:     cloNIDine (Catapres) 0.2 mg tablet, TAKE 1 TABLET(0.2 MG) BY MOUTH DAILY AT BEDTIME, Disp: 90 tablet, Rfl: 1    lisdexamfetamine (Vyvanse) 30 mg capsule, Take 1 capsule (30 mg) by mouth once daily in the morning. Take before meals., Disp: 30 capsule, Rfl: 0    QUEtiapine (SEROquel) 50 mg tablet, TAKE 1 TABLET(50 MG) BY MOUTH DAILY AT BEDTIME, Disp: 90 tablet, Rfl: 0    SUMAtriptan (Imitrex) 100 mg tablet, Take 1 tablet (100 mg) by mouth 1 time if needed for migraine., Disp: , Rfl:     tadalafil (Cialis) 10 mg tablet, Take 1 tablet (10 mg) by mouth once daily as needed for erectile dysfunction., Disp: 10 tablet, Rfl: 1  [2] No Known Allergies  [3]   Past Surgical History:  Procedure Laterality Date    ANTERIOR CRUCIATE LIGAMENT REPAIR      KNEE SURGERY Left    [4]   Past Medical History:  Diagnosis Date    Back pain     Injury of anterior cruciate ligament, acute     Low back sprain     Low back strain     Migraine     Neck pain     Neck strain     TBI (traumatic brain injury) (Multi)     In , he fell 30 feet and landed on head.    Tear of meniscus of knee

## 2025-07-11 DIAGNOSIS — F90.9 ATTENTION DEFICIT HYPERACTIVITY DISORDER (ADHD), UNSPECIFIED ADHD TYPE: ICD-10-CM

## 2025-07-11 NOTE — TELEPHONE ENCOUNTER
Patient of DR. Deutsch    Refill request  (Patient has less then week of medication)    Last seen 04/25/2025  Drug screen 04/25/2025  Drug contact 01/24/2025     Disp Refills Start End    lisdexamfetamine (Vyvanse) 30 mg capsule 30 capsule 0 6/16/2025 --    Sig - Route: Take 1 capsule (30 mg) by mouth once daily in the morning. Take before meals. - oral    Sent to pharmacy as: lisdexamfetamine 30 mg capsule (Vyvanse)      [id number] [address] AT [facility] [place] Phone: [phone]   Fax: [phone]

## 2025-07-14 ENCOUNTER — APPOINTMENT (OUTPATIENT)
Dept: ORTHOPEDIC SURGERY | Facility: CLINIC | Age: 35
End: 2025-07-14
Payer: OTHER GOVERNMENT

## 2025-07-14 DIAGNOSIS — M17.0 PRIMARY OSTEOARTHRITIS OF BOTH KNEES: Primary | ICD-10-CM

## 2025-07-14 PROCEDURE — 99213 OFFICE O/P EST LOW 20 MIN: CPT | Performed by: STUDENT IN AN ORGANIZED HEALTH CARE EDUCATION/TRAINING PROGRAM

## 2025-07-14 RX ORDER — METHYLPREDNISOLONE 4 MG/1
4 TABLET ORAL ONCE
Qty: 21 TABLET | Refills: 0 | Status: SHIPPED | OUTPATIENT
Start: 2025-07-14 | End: 2025-07-14

## 2025-07-14 RX ORDER — MELOXICAM 15 MG/1
15 TABLET ORAL DAILY PRN
Qty: 30 TABLET | Refills: 1 | Status: SHIPPED | OUTPATIENT
Start: 2025-07-14 | End: 2025-09-12

## 2025-07-14 RX ORDER — LISDEXAMFETAMINE DIMESYLATE 30 MG/1
30 CAPSULE ORAL
Qty: 30 CAPSULE | Refills: 0 | Status: SHIPPED | OUTPATIENT
Start: 2025-07-14

## 2025-07-22 NOTE — PROGRESS NOTES
Assessment     Robe is a 35 y.o. male with significant past medical history of ADHD,  Hx of brain injury with vertigo (TBI) and spine injury (cervical sprain) -- fell 30 feet onto his head while in the [employer]-- chronic head and neck pain,, who presents with right hand pain and finger weakness  .  The patient's symptoms, clinical exam and imaging studies are suggestive of posterior interosseus strain.   Plan     At this time, I would like to make the following recommendation/plan:  -  Physical Therapy: provided Home exercise program  -  Medication: ordered medrol dose pack, followed by meloxicam has helped previously   -  Studies: Interpreted: Ultrasound of hand performed in office notable neovascularization of posterior interossei   -  Discussed activity modification at length as well ad finger michele taping (tape provided)    Follow up:  Follow up in prn.    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   Pain first started on 02/2025, when he lowered a weight to the ground. The pain as located in the lateral dorsal ulnar surface of the right hand hypothenar.     Pain:        Severity:  Robe Lr states pain is: 7/10 at it's worst. On average pain is 4/10 (Scale 0-no pain, 10-worst pain)      Quality:  dull, aching, and throbbing.       Radiating: up th elbow to the shoulder      Aggravating Factors: rotation the wrist in ulnar and radial deviation, using the rower, gropping with thumb       Alleviating Factors:  steroids , stops exacerbating activity       Time of day: worse first thing in the morning.       Associated symptoms:  no numbness, no tingling; chronic balance problems      Physical Therapy/Occupational Therapy/Other Modalities:  hasn't tired      Topicals:   ICE/Heat: heat helps temporarily   Topicals (Capsicin/Diclofenac gel/Salonpas/Lidocaine): hasn't tired     Medications:   - Over the counter : (Tylenol, NSAIDs)  ibuprofen mildly  helpful   - Steroid: steroid helpful   - Gabapentin/Lyrica: hasn't tired   - Muscle relaxer: methocarbamol not helpful   - Duloxetine/Topamax/oxcarbazepine: hasn't tired   - Opiates:hasn't tired     Current Medications[1]    RX Allergies[2]    Injections:    Date/Injection Type/Location/%relief/ Lasting  - no hc of CSI  Surgery:  Date/Type/Location/%relief/ Lasting  - 2019 left knee ACL reconstruction     Surgical History[3]    Medical History[4]      ROS:  - Sleep: Sleep isn't disrupted secondary to pain  - Bowel/Bladder: Denies bowel/bladder dysfunction  - Falls: Denies fall  - Weight changes: reports intentional 30 lbs wt loss   - Mood/Psych: Denies any feelings of anxiety or depression    12-point review of systems was completed and is otherwise negative except as noted in the HPI.      Social Hx:  - Home: Lives with wife in a house.   - ADLs: Independent in ADLs and ambulation without assistive device.   - Hobbies: gym/exercise (modified-- thumb-less / lower weight), eat food, eat food  - Work:  It  - Smoking/Alcohol/Drugs: No/rarely/No    Objective     Physical Exam: (unchanged)  General:  Appears state age, in NAD, and overweight  Psychological:  Normal mood and affect  Pulm:  Breathing comfortably on RA  LUNG: Nonlabored breathing  HEART: No clubbing or cyanosis  SKIN: No increased erythema, warmth, rashes, or concerning skin lesions  NEURO: Sensation is intact in the bilateral upper extremities. Strength is grossly 5 out of 5 throughout the bilateral upper extremities, unless noted below.  GAIT: Non-antalgic.  Examination of the right wrist and hand: Wrist range of motion is full and pain free. No swelling or ecchymosis. No thenar or  hypothenar atrophy. No tenderness with palpation of the wrist joint, TFCC region, ECU tendon, 1st dorsal compartment, 1st CMC joint or STT joint. No tenderness with palpation of the scaphoid. CMC grind test is negative. Finklestein's test is negative. Negative Tinel's over the median nerve at the wrist.  Negative Phalen's and negative carpal tunnel compression test. Pain with finger abduction.    Imaging and Other Studies:    Imaging  No results found.    Cardiology, Vascular, and Other Imaging  No other imaging results found for the past 7 days           [1]   Current Outpatient Medications:     cloNIDine (Catapres) 0.2 mg tablet, TAKE 1 TABLET(0.2 MG) BY MOUTH DAILY AT BEDTIME, Disp: 90 tablet, Rfl: 1    lisdexamfetamine (Vyvanse) 30 mg capsule, Take 1 capsule (30 mg) by mouth once daily in the morning. Take before meals., Disp: 30 capsule, Rfl: 0    meloxicam (Mobic) 15 mg tablet, Take 1 tablet (15 mg) by mouth once daily as needed for moderate pain (4 - 6). Start after finishing medrol dose pack. Take 14 days daily followed by as needed, Disp: 30 tablet, Rfl: 1    QUEtiapine (SEROquel) 50 mg tablet, TAKE 1 TABLET(50 MG) BY MOUTH DAILY AT BEDTIME, Disp: 90 tablet, Rfl: 0    SUMAtriptan (Imitrex) 100 mg tablet, Take 1 tablet (100 mg) by mouth 1 time if needed for migraine., Disp: , Rfl:     tadalafil (Cialis) 10 mg tablet, Take 1 tablet (10 mg) by mouth once daily as needed for erectile dysfunction., Disp: 10 tablet, Rfl: 1  [2] No Known Allergies  [3]   Past Surgical History:  Procedure Laterality Date    ANTERIOR CRUCIATE LIGAMENT REPAIR      KNEE SURGERY Left    [4]   Past Medical History:  Diagnosis Date    Back pain     Injury of anterior cruciate ligament, acute     Low back sprain     Low back strain     Migraine     Neck pain     Neck strain     TBI (traumatic brain injury) (Multi)     In , he fell 30 feet and landed on head.    Tear of meniscus of knee

## 2025-07-25 ENCOUNTER — APPOINTMENT (OUTPATIENT)
Dept: PRIMARY CARE | Facility: CLINIC | Age: 35
End: 2025-07-25
Payer: OTHER GOVERNMENT

## 2025-07-25 VITALS
HEIGHT: 73 IN | TEMPERATURE: 97.9 F | RESPIRATION RATE: 16 BRPM | SYSTOLIC BLOOD PRESSURE: 118 MMHG | HEART RATE: 69 BPM | DIASTOLIC BLOOD PRESSURE: 72 MMHG | BODY MASS INDEX: 29.4 KG/M2 | WEIGHT: 221.8 LBS | OXYGEN SATURATION: 98 %

## 2025-07-25 DIAGNOSIS — E55.9 VITAMIN D DEFICIENCY: ICD-10-CM

## 2025-07-25 DIAGNOSIS — G47.09 OTHER INSOMNIA: ICD-10-CM

## 2025-07-25 DIAGNOSIS — M79.641 HAND PAIN, RIGHT: ICD-10-CM

## 2025-07-25 DIAGNOSIS — G43.511 MIGRAINE AURA, PERSISTENT, INTRACTABLE, WITH STATUS MIGRAINOSUS: ICD-10-CM

## 2025-07-25 DIAGNOSIS — Z79.899 MEDICATION MANAGEMENT: ICD-10-CM

## 2025-07-25 DIAGNOSIS — E78.5 DYSLIPIDEMIA: ICD-10-CM

## 2025-07-25 DIAGNOSIS — N52.9 ERECTILE DYSFUNCTION, UNSPECIFIED ERECTILE DYSFUNCTION TYPE: ICD-10-CM

## 2025-07-25 DIAGNOSIS — Z00.00 HEALTH MAINTENANCE EXAMINATION: ICD-10-CM

## 2025-07-25 DIAGNOSIS — R53.83 FATIGUE, UNSPECIFIED TYPE: ICD-10-CM

## 2025-07-25 DIAGNOSIS — S06.9XAA TRAUMATIC BRAIN INJURY, WITH UNKNOWN LOSS OF CONSCIOUSNESS STATUS, INITIAL ENCOUNTER (MULTI): ICD-10-CM

## 2025-07-25 DIAGNOSIS — F90.9 ATTENTION DEFICIT HYPERACTIVITY DISORDER (ADHD), UNSPECIFIED ADHD TYPE: ICD-10-CM

## 2025-07-25 PROCEDURE — 3008F BODY MASS INDEX DOCD: CPT | Performed by: FAMILY MEDICINE

## 2025-07-25 PROCEDURE — 99214 OFFICE O/P EST MOD 30 MIN: CPT | Performed by: FAMILY MEDICINE

## 2025-07-25 NOTE — PROGRESS NOTES
Subjective   Patient ID: Robe Lr is a 35 y.o. male who presents for Follow-up.  History of Present Illness  The patient presents for ADD, right hand pain, left knee arthritis, migraine, insomnia, and stress.    He reports a positive response to Vyvanse, which he has been taking at the same dosage for several years without any need for adjustment. Occasionally, he struggles with focus but does not wish to increase his medication dosage. He supplements his treatment with coffee in the afternoons, although not daily. He is currently working part-time, 20 hours per week, and finds this schedule manageable.    He has been experiencing intermittent right hand pain for the past 4 to 5 months. The pain is described as a squeezing sensation that varies in location and intensity. An ultrasound of his nerves revealed an area of redness, which was identified as the source of his pain. He has been performing daily exercises using bands and weights and has tried both ice and heat therapy for relief. He is also taking meloxicam. He is under the care of Dr. Ortega for this issue. He underwent a root canal procedure and was prescribed steroids and antibiotics prior to the procedure. The steroids appeared to alleviate his hand pain, leading him to consult a hand specialist, Dr. Ortega, who suggested that the steroids might be beneficial if the pain was nerve-related. She prescribed additional steroids, but the pain persists intermittently. If the pain continues, an MRI of his hand may be considered.    He has a history of left knee arthritis and has undergone surgery. He experiences occasional pain but maintains an active lifestyle, opting for walking over running when his knee is painful. He has recently incorporated weighted leg exercises into his routine to strengthen his leg.    His migraines are well-managed, and he has not experienced an episode in some time.    His insomnia and stress are effectively managed with  quetiapine and clonidine. He notes that he has developed tolerance to other medications in the past, but this combination remains effective.    Occupations: Part-time work, 20 hours per week  Coffee/Tea/Caffeine-containing Drinks: Occasionally drinks coffee in the afternoons    PAST SURGICAL HISTORY:  - Left knee surgery  - Root canal procedure  See Above  Review of Systems  12 Systems have been reviewed as follows.  Constitutional: Fever, weight gain, weight loss, appetite change, night sweats, fatigue, chills.  Eyes : blurry, double vision, vision, loss, tearing, redness, pain, sensitivity to light, glaucoma.  Ears, nose, mouth, and throat: Hearing loss, ringing in the ears, ear pain, nasal congestion, nasal drainage, nosebleeds, mouth, throat, irritation tooth problem.  Cardiovascular :chest pain, pressure, heart racing, palpitations, sweating, leg swelling, high or low blood pressure  Pulmonary: Cough, yellow or green sputum, blood and sputum, shortness of breath, wheezing  Gastrointestinal: Nausea, vomiting, diarrhea, constipation, pain, blood in stool, or vomitus, heartburn, difficulty swallowing  Genitourinary: incontinence, abnormal bleeding, abnormal discharge, urinary frequency, urinary hesitancy, pain, impotence sexual problem, infection, urinary retention  Musculoskeletal: Pain, stiffness, joint, redness or warmth, arthritis, back pain, weakness, muscle wasting, sprain or fracture  Neuro: Weight weakness, dizziness, change in voice, change in taste change in vision, change in hearing, loss, or change of sensation, trouble walking, balance problems coordination problems, shaking, speech problem  Endocrine , cold or heat intolerance, blood sugar problem, weight gain or loss missed periods hot flashes, sweats, change in body hair, change in libido, increased thirst, increased urination  Heme/lymph: Swelling, bleeding, problem anemia, bruising, enlarged lymph nodes  Allergic/immunologic: H. plus nasal drip,  "watery itchy eyes, nasal drainage, immunosuppressed  The above were reviewed and noted negative except as noted in HPI and Problem List.    Objective     /72 (BP Location: Right arm, Patient Position: Sitting, BP Cuff Size: Large adult)   Pulse 69   Temp 36.6 °C (97.9 °F) (Temporal)   Resp 16   Ht 1.854 m (6' 1\")   Wt 101 kg (221 lb 12.8 oz)   SpO2 98%   BMI 29.26 kg/m²      Physical Exam    Constitutional: Well developed, well nourished, alert and in no acute distress   Eyes: Normal external exam. Pupils equally round and reactive to light with normal accommodation and extraocular movements intact.  Neck: Supple, no lymphadenopathy or masses.   Cardiovascular: Regular rate and rhythm, normal S1 and S2, no murmurs, gallops, or rubs. Radial pulses normal. No peripheral edema.  Pulmonary: No respiratory distress, lungs clear to auscultation bilaterally. No wheezes, rhonchi, rales.  Abdomen: soft,non tender, non distended, without masses or HSM  Skin: Warm, well perfused, normal skin turgor and color.   Neurologic: Cranial nerves II-XII grossly intact.   Psychiatric: Mood calm and affect normal  Musculoskeletal: Moving all extremities without restriction  The above were reviewed and noted negative except as noted in HPI and Problem List.      Results  Imaging   - Ultrasound of the nerves in the hand: Showed a spot of irritation         Assessment & Plan  1. Attention Deficit Disorder (ADD).  - ADD is well-managed with the current regimen of Vyvanse.  - Reports that the current dosage is effective and does not require any changes.  - Continues to use Vyvanse without issues.  - Advised to maintain the current dosage of Vyvanse.    2. Right hand pain.  - Experiencing right hand pain for about 4-5 months, intermittent and sometimes severe.  - Using meloxicam for inflammation; advised to avoid heat application as it may exacerbate inflammation.  - Continues using meloxicam as needed and performs three sets of " 15 hand exercises daily.  - If pain persists, an MRI may be considered.    3. Left knee arthritis.  - Reports occasional pain in the left knee, managed by staying active and avoiding running when it hurts.  - Recently started weighted leg exercises to build strength.  - Advised to continue with these exercises and perform three sets of 15 straight leg raises in the morning.    4. Insomnia and stress.  - Insomnia and stress are well-managed with quetiapine and clonidine.  - Reports that this combination is effective and does not need any changes to the dosage.  - Continues with the current regimen of quetiapine and clonidine.    Follow-up: A follow-up visit is scheduled in 3 months.    Problem List Items Addressed This Visit       TBI (traumatic brain injury) (Multi)    Relevant Orders    Follow Up In [facility] - PCP - Established    ADHD    Relevant Orders    Follow Up [facility] - PCP - Established    Migraine aura, persistent, intractable, with status migrainosus    Relevant Orders    Follow Up [facility] - PCP - Established     Other Visit Diagnoses         Fatigue, unspecified type        Relevant Orders    Follow Up [facility] - PCP - Established      Dyslipidemia        Relevant Orders    Follow Up [facility] - PCP - Established      Vitamin D deficiency        Relevant Orders    Follow Up In [facility] - PCP - Established      Erectile dysfunction, unspecified erectile dysfunction type        Relevant Orders    Follow Up In [facility] - PCP - Established      Medication management        Relevant Orders    Opiate/Opioid/Benzo Prescription Compliance    Follow Up In [facility] - PCP - Established      Other insomnia        Relevant Orders    Follow Up In [facility] - PCP - Established      Hand pain, right        Relevant Orders    Follow Up In [facility] - PCP - Established      Health  maintenance examination        Relevant Orders    Follow Up In [facility] - PCP - Established         High wt 275 lbs       Continue current medications and therapy for chronic medical conditions     I have personally reviewed the [facility] report with the patient and have considered the risk of abuse, addiction, dependence and diversion.     Patient's use of medication is allowing patient to be able to perform ADL's. Patient is always being evaluated for the possibility of lowering the medication dosage.    Earl Deutsch MD       This medical note was created with the assistance of artificial intelligence (AI) for documentation purposes. The content has been reviewed and confirmed by the healthcare provider for accuracy and completeness. Patient consented to the use of audio recording and use of AI during their visit.

## 2025-08-08 DIAGNOSIS — F90.9 ATTENTION DEFICIT HYPERACTIVITY DISORDER (ADHD), UNSPECIFIED ADHD TYPE: ICD-10-CM

## 2025-08-08 RX ORDER — CLONIDINE HYDROCHLORIDE 0.2 MG/1
TABLET ORAL
Qty: 90 TABLET | Refills: 1 | Status: SHIPPED | OUTPATIENT
Start: 2025-08-08

## 2025-08-08 RX ORDER — QUETIAPINE FUMARATE 50 MG/1
TABLET, FILM COATED ORAL
Qty: 90 TABLET | Refills: 0 | Status: SHIPPED | OUTPATIENT
Start: 2025-08-08

## 2025-08-08 NOTE — TELEPHONE ENCOUNTER
Recent Visits  Date Type Provider Dept   07[facility]25 [facility]   Showing recent visits within past 90 days and meeting all other requirements  Future Appointments  Date Type Provider Dept   10/24/25 Appointment Earl Deutsch MD [facility]   Showing future appointments within next 90 days and meeting all other requirements

## 2025-08-14 DIAGNOSIS — F90.9 ATTENTION DEFICIT HYPERACTIVITY DISORDER (ADHD), UNSPECIFIED ADHD TYPE: ICD-10-CM

## 2025-08-15 RX ORDER — LISDEXAMFETAMINE DIMESYLATE 30 MG/1
30 CAPSULE ORAL
Qty: 30 CAPSULE | Refills: 0 | Status: SHIPPED | OUTPATIENT
Start: 2025-08-15

## 2025-08-18 ENCOUNTER — APPOINTMENT (OUTPATIENT)
Dept: ORTHOPEDIC SURGERY | Facility: CLINIC | Age: 35
End: 2025-08-18
Payer: OTHER GOVERNMENT

## 2025-10-24 ENCOUNTER — APPOINTMENT (OUTPATIENT)
Dept: PRIMARY CARE | Facility: CLINIC | Age: 35
End: 2025-10-24
Payer: OTHER GOVERNMENT